# Patient Record
Sex: MALE | Race: WHITE | Employment: OTHER | URBAN - NONMETROPOLITAN AREA
[De-identification: names, ages, dates, MRNs, and addresses within clinical notes are randomized per-mention and may not be internally consistent; named-entity substitution may affect disease eponyms.]

---

## 2017-01-16 ENCOUNTER — OFFICE VISIT (OUTPATIENT)
Dept: FAMILY MEDICINE CLINIC | Age: 57
End: 2017-01-16

## 2017-01-16 VITALS
HEART RATE: 56 BPM | BODY MASS INDEX: 28.27 KG/M2 | SYSTOLIC BLOOD PRESSURE: 118 MMHG | OXYGEN SATURATION: 98 % | WEIGHT: 197 LBS | DIASTOLIC BLOOD PRESSURE: 60 MMHG

## 2017-01-16 DIAGNOSIS — Z23 NEEDS FLU SHOT: ICD-10-CM

## 2017-01-16 DIAGNOSIS — H61.20 IMPACTED CERUMEN, UNSPECIFIED LATERALITY: ICD-10-CM

## 2017-01-16 DIAGNOSIS — I10 ESSENTIAL HYPERTENSION: Primary | ICD-10-CM

## 2017-01-16 PROCEDURE — G8484 FLU IMMUNIZE NO ADMIN: HCPCS | Performed by: FAMILY MEDICINE

## 2017-01-16 PROCEDURE — 3017F COLORECTAL CA SCREEN DOC REV: CPT | Performed by: FAMILY MEDICINE

## 2017-01-16 PROCEDURE — 99213 OFFICE O/P EST LOW 20 MIN: CPT | Performed by: FAMILY MEDICINE

## 2017-01-16 PROCEDURE — 69210 REMOVE IMPACTED EAR WAX UNI: CPT | Performed by: FAMILY MEDICINE

## 2017-01-16 PROCEDURE — 90686 IIV4 VACC NO PRSV 0.5 ML IM: CPT | Performed by: FAMILY MEDICINE

## 2017-01-16 PROCEDURE — G8419 CALC BMI OUT NRM PARAM NOF/U: HCPCS | Performed by: FAMILY MEDICINE

## 2017-01-16 PROCEDURE — 1036F TOBACCO NON-USER: CPT | Performed by: FAMILY MEDICINE

## 2017-01-16 PROCEDURE — G8428 CUR MEDS NOT DOCUMENT: HCPCS | Performed by: FAMILY MEDICINE

## 2017-01-16 PROCEDURE — 90471 IMMUNIZATION ADMIN: CPT | Performed by: FAMILY MEDICINE

## 2017-01-16 RX ORDER — ACETAMINOPHEN 160 MG
TABLET,DISINTEGRATING ORAL
COMMUNITY
End: 2021-07-07

## 2017-01-16 RX ORDER — LISINOPRIL AND HYDROCHLOROTHIAZIDE 25; 20 MG/1; MG/1
1 TABLET ORAL DAILY
Qty: 90 TABLET | Refills: 3 | Status: SHIPPED | OUTPATIENT
Start: 2017-01-16 | End: 2018-02-05 | Stop reason: SDUPTHER

## 2017-01-16 RX ORDER — METOPROLOL SUCCINATE 25 MG/1
25 TABLET, EXTENDED RELEASE ORAL DAILY
Qty: 90 TABLET | Refills: 3 | Status: SHIPPED | OUTPATIENT
Start: 2017-01-16 | End: 2018-02-05 | Stop reason: ALTCHOICE

## 2017-01-16 ASSESSMENT — ENCOUNTER SYMPTOMS
GASTROINTESTINAL NEGATIVE: 1
RESPIRATORY NEGATIVE: 1

## 2017-01-31 ENCOUNTER — OFFICE VISIT (OUTPATIENT)
Dept: FAMILY MEDICINE CLINIC | Age: 57
End: 2017-01-31

## 2017-01-31 VITALS
OXYGEN SATURATION: 96 % | RESPIRATION RATE: 16 BRPM | WEIGHT: 198 LBS | BODY MASS INDEX: 29.33 KG/M2 | SYSTOLIC BLOOD PRESSURE: 116 MMHG | DIASTOLIC BLOOD PRESSURE: 70 MMHG | HEART RATE: 50 BPM | HEIGHT: 69 IN

## 2017-01-31 DIAGNOSIS — Z00.00 WELLNESS EXAMINATION: Primary | ICD-10-CM

## 2017-01-31 LAB
-: ABNORMAL
ABSOLUTE EOS #: 0.1 K/UL (ref 0–0.4)
ABSOLUTE LYMPH #: 1.4 K/UL (ref 1–4.8)
ABSOLUTE MONO #: 0.5 K/UL (ref 0.1–1.2)
ALBUMIN SERPL-MCNC: 4.3 G/DL (ref 3.5–5.2)
ALBUMIN/GLOBULIN RATIO: 1.8 (ref 1–2.5)
ALP BLD-CCNC: 51 U/L (ref 40–129)
ALT SERPL-CCNC: 10 U/L (ref 5–41)
AMORPHOUS: ABNORMAL
ANION GAP SERPL CALCULATED.3IONS-SCNC: 12 MMOL/L (ref 9–17)
AST SERPL-CCNC: 14 U/L
BACTERIA: ABNORMAL
BASOPHILS # BLD: 1 % (ref 0–2)
BASOPHILS ABSOLUTE: 0 K/UL (ref 0–0.2)
BILIRUB SERPL-MCNC: 0.53 MG/DL (ref 0.3–1.2)
BILIRUBIN URINE: NEGATIVE
BUN BLDV-MCNC: 17 MG/DL (ref 6–20)
BUN/CREAT BLD: 16 (ref 9–20)
CALCIUM SERPL-MCNC: 9.5 MG/DL (ref 8.6–10.4)
CASTS UA: ABNORMAL /LPF (ref 0–2)
CHLORIDE BLD-SCNC: 101 MMOL/L (ref 98–107)
CO2: 29 MMOL/L (ref 20–31)
COLOR: NORMAL
COMMENT UA: NORMAL
CREAT SERPL-MCNC: 1.05 MG/DL (ref 0.7–1.2)
CRYSTALS, UA: ABNORMAL /HPF
DIFFERENTIAL TYPE: ABNORMAL
EOSINOPHILS RELATIVE PERCENT: 2 % (ref 1–4)
EPITHELIAL CELLS UA: ABNORMAL /HPF (ref 0–5)
GFR AFRICAN AMERICAN: >60 ML/MIN
GFR NON-AFRICAN AMERICAN: >60 ML/MIN
GFR SERPL CREATININE-BSD FRML MDRD: ABNORMAL ML/MIN/{1.73_M2}
GFR SERPL CREATININE-BSD FRML MDRD: ABNORMAL ML/MIN/{1.73_M2}
GLUCOSE BLD-MCNC: 106 MG/DL (ref 70–99)
GLUCOSE URINE: NEGATIVE
HCT VFR BLD CALC: 42.5 % (ref 41–53)
HEMOGLOBIN: 14.3 G/DL (ref 13.5–17.5)
KETONES, URINE: NEGATIVE
LEUKOCYTE ESTERASE, URINE: NEGATIVE
LYMPHOCYTES # BLD: 28 % (ref 24–44)
MCH RBC QN AUTO: 29 PG (ref 26–34)
MCHC RBC AUTO-ENTMCNC: 33.6 G/DL (ref 31–37)
MCV RBC AUTO: 86.5 FL (ref 80–100)
MONOCYTES # BLD: 10 % (ref 1–7)
MUCUS: ABNORMAL
NITRITE, URINE: NEGATIVE
OTHER OBSERVATIONS UA: ABNORMAL
PDW BLD-RTO: 13.3 % (ref 11–14.5)
PH UA: 6 (ref 5–6)
PLATELET # BLD: 215 K/UL (ref 140–450)
PLATELET ESTIMATE: ABNORMAL
PMV BLD AUTO: 8.6 FL (ref 6–12)
POTASSIUM SERPL-SCNC: 4.8 MMOL/L (ref 3.7–5.3)
PROSTATE SPECIFIC ANTIGEN: 1.31 UG/L
PROTEIN UA: NEGATIVE
RBC # BLD: 4.91 M/UL (ref 4.5–5.9)
RBC # BLD: ABNORMAL 10*6/UL
RBC UA: ABNORMAL /HPF (ref 0–4)
RENAL EPITHELIAL, UA: ABNORMAL /HPF
SEG NEUTROPHILS: 59 % (ref 36–66)
SEGMENTED NEUTROPHILS ABSOLUTE COUNT: 3 K/UL (ref 1.8–7.7)
SODIUM BLD-SCNC: 142 MMOL/L (ref 135–144)
SPECIFIC GRAVITY UA: 1.02 (ref 1.01–1.02)
TOTAL PROTEIN: 6.7 G/DL (ref 6.4–8.3)
TRICHOMONAS: ABNORMAL
TURBIDITY: NORMAL
URINE HGB: NEGATIVE
UROBILINOGEN, URINE: NORMAL
WBC # BLD: 5.1 K/UL (ref 3.5–11)
WBC # BLD: ABNORMAL 10*3/UL
WBC UA: ABNORMAL /HPF (ref 0–4)
YEAST: ABNORMAL

## 2017-01-31 PROCEDURE — 85025 COMPLETE CBC W/AUTO DIFF WBC: CPT | Performed by: FAMILY MEDICINE

## 2017-01-31 PROCEDURE — 99396 PREV VISIT EST AGE 40-64: CPT | Performed by: FAMILY MEDICINE

## 2017-01-31 PROCEDURE — G0103 PSA SCREENING: HCPCS | Performed by: FAMILY MEDICINE

## 2017-01-31 PROCEDURE — 81001 URINALYSIS AUTO W/SCOPE: CPT | Performed by: FAMILY MEDICINE

## 2017-01-31 PROCEDURE — 36415 COLL VENOUS BLD VENIPUNCTURE: CPT | Performed by: FAMILY MEDICINE

## 2017-01-31 PROCEDURE — 80053 COMPREHEN METABOLIC PANEL: CPT | Performed by: FAMILY MEDICINE

## 2017-12-19 ENCOUNTER — OFFICE VISIT (OUTPATIENT)
Dept: FAMILY MEDICINE CLINIC | Age: 57
End: 2017-12-19
Payer: COMMERCIAL

## 2017-12-19 VITALS
WEIGHT: 200.2 LBS | HEIGHT: 69 IN | SYSTOLIC BLOOD PRESSURE: 126 MMHG | OXYGEN SATURATION: 98 % | HEART RATE: 62 BPM | BODY MASS INDEX: 29.65 KG/M2 | DIASTOLIC BLOOD PRESSURE: 72 MMHG

## 2017-12-19 DIAGNOSIS — Z23 NEED FOR IMMUNIZATION AGAINST INFLUENZA: ICD-10-CM

## 2017-12-19 DIAGNOSIS — M25.532 LEFT WRIST PAIN: Primary | ICD-10-CM

## 2017-12-19 PROCEDURE — 3017F COLORECTAL CA SCREEN DOC REV: CPT | Performed by: NURSE PRACTITIONER

## 2017-12-19 PROCEDURE — G8484 FLU IMMUNIZE NO ADMIN: HCPCS | Performed by: NURSE PRACTITIONER

## 2017-12-19 PROCEDURE — 99213 OFFICE O/P EST LOW 20 MIN: CPT | Performed by: NURSE PRACTITIONER

## 2017-12-19 PROCEDURE — G8427 DOCREV CUR MEDS BY ELIG CLIN: HCPCS | Performed by: NURSE PRACTITIONER

## 2017-12-19 PROCEDURE — 1036F TOBACCO NON-USER: CPT | Performed by: NURSE PRACTITIONER

## 2017-12-19 PROCEDURE — G8419 CALC BMI OUT NRM PARAM NOF/U: HCPCS | Performed by: NURSE PRACTITIONER

## 2017-12-19 PROCEDURE — 90686 IIV4 VACC NO PRSV 0.5 ML IM: CPT | Performed by: NURSE PRACTITIONER

## 2017-12-19 PROCEDURE — 90471 IMMUNIZATION ADMIN: CPT | Performed by: NURSE PRACTITIONER

## 2017-12-19 ASSESSMENT — ENCOUNTER SYMPTOMS
DIARRHEA: 0
VOMITING: 0
COUGH: 0
SHORTNESS OF BREATH: 0

## 2017-12-19 NOTE — PROGRESS NOTES
[address]  [phone]      Adair Trinh is a 61 y.o. male who presents today for his medical conditions/complaints as noted below. Adair Trinh is c/o of Wrist Pain (fell a month and a half ago- left sided) and Flu Vaccine (would like today)      HPI:     Wrist Injury    The incident occurred more than 1 week ago. Injury mechanism: fall on outstretched hand. The pain is present in the left wrist. The pain does not radiate. Pertinent negatives include no chest pain, muscle weakness, numbness or tingling. He has tried NSAIDs and immobilization for the symptoms. The treatment provided moderate relief.        Past Medical History:   Diagnosis Date    Back pain     history of    Basal cell cancer 2008    history of, right scapula, right eyelid, mid back,    Benign prostatic hypertrophy     history of    Cancer of lower lip     pre cancer per dermatologist    Carotid bruit     history of    Chondromalacia     External hemorrhoid     history of    Fibrous histiocytoma     history of, left ankle    Hearing deficit     Hypertension     Left hip pain     with arthritis    Numbness and tingling     of arms    Rectal bleeding     history of    Right knee pain     history of    Right shoulder pain     history of    Ulnar neuropathy     history of      Past Surgical History:   Procedure Laterality Date    COLONOSCOPY  2005    normal    COLONOSCOPY  3/2015    hemorrhoids    KNEE SURGERY Right 1981    MOHS SURGERY Right 09/18/2008    wide exc ba cell ca with z plasty right scapula    MOHS SURGERY Right 12/13/2011    right eyelid basal cell cancer    MOHS SURGERY Left 1/28/15    left upper nose basal cell carcinoman Dr Usha Fontenot Bilateral 1997    TOTAL KNEE ARTHROPLASTY Right 2009    VASECTOMY       Family History   Problem Relation Age of Onset    Stroke Father     Cancer Father      Stomach    Other Other      carotid artery but does note some tingling in digits 2, 3, 4 upon this motion   Lymphadenopathy:     He has no cervical adenopathy. Psychiatric: He has a normal mood and affect. His behavior is normal.   Nursing note and vitals reviewed. Assessment:      1. Left wrist pain  XR WRIST LEFT (MIN 3 VIEWS)   2. Need for immunization against influenza  INFLUENZA, QUADV, 3 YRS AND OLDER, IM, PF, PREFILL SYR OR SDV, 0.5ML (FLUZONE QUADV, PF)       Plan:      Return if symptoms worsen or fail to improve. Orders Placed This Encounter   Procedures    XR WRIST LEFT (MIN 3 VIEWS)     Standing Status:   Future     Standing Expiration Date:   12/19/2018     Order Specific Question:   Reason for exam:     Answer:   H/O fall on outstretched hand 6 weeks ago    INFLUENZA, QUADV, 3 YRS AND OLDER, IM, PF, PREFILL SYR OR SDV, 0.5ML (FLUZONE QUADV, PF)     No orders of the defined types were placed in this encounter. Wrist pain - will obtain xray to r/o fracture, consider CTS if xray normal given clinical symptoms. Discussed immobilization brace and exercise for CTS if xray WNL, patient is agreeable. Patient given educational materials - see patient instructions. All patient questions answered. Pt voiced understanding.          Electronically signed by CURTIS Cox on 12/19/2017 at 10:22 AM

## 2017-12-19 NOTE — PROGRESS NOTES
Have you had an allergic reaction to the flu (influenza) shot? no  Are you allergic to eggs or any component of the flu vaccine? no  Do you have a history of Guillain-[place] Syndrome (GBS), which is paralysis after receiving the flu vaccine? no  Are you feeling well today? yes  Flu vaccine given as ordered. Patient tolerated it well. No questions re: VIS information.

## 2017-12-19 NOTE — PATIENT INSTRUCTIONS
If xray is normal and no fracture, can consider some of the exercises below for possible carpal tunnel, as well as immobilizing braces as discussed. Patient Education        Carpal Tunnel Syndrome: Exercises  Your Care Instructions  Here are some examples of typical rehabilitation exercises for your condition. Start each exercise slowly. Ease off the exercise if you start to have pain. Your doctor or your physical or occupational therapist will tell you when you can start these exercises and which ones will work best for you. Warm-up stretches  When you no longer have pain or numbness, you can do exercises to help prevent carpal tunnel syndrome from coming back. Do not do any stretch or movement that is uncomfortable or painful. 1. Rotate your wrist up, down, and from side to side. Repeat 4 times. 2. Stretch your fingers far apart. Relax them, and then stretch them again. Repeat 4 times. 3. Stretch your thumb by pulling it back gently, holding it, and then releasing it. Repeat 4 times. How to do the exercises  Prayer stretch    1. Start with your palms together in front of your chest just below your chin. 2. Slowly lower your hands toward your waistline, keeping your hands close to your stomach and your palms together until you feel a mild to moderate stretch under your forearms. 3. Hold for at least 15 to 30 seconds. Repeat 2 to 4 times. Wrist flexor stretch    1. Extend your arm in front of you with your palm up. 2. Bend your wrist, pointing your hand toward the floor. 3. With your other hand, gently bend your wrist farther until you feel a mild to moderate stretch in your forearm. 4. Hold for at least 15 to 30 seconds. Repeat 2 to 4 times. Wrist extensor stretch    1. Repeat steps 1 through 4 of the stretch above, but begin with your extended hand palm down. Follow-up care is a key part of your treatment and safety.  Be sure to make and go to all appointments, and call your doctor if you are having problems. It's also a good idea to know your test results and keep a list of the medicines you take. Where can you learn more? Go to https://chpepiceweb.[email or web address] [email or web address]. org and sign in to your Ivalua account. Enter O610 in the Elastera box to learn more about \"Carpal Tunnel Syndrome: Exercises. \"     If you do not have an account, please click on the \"Sign Up Now\" link. Current as of: March 21, 2017  Content Version: 11.4  © 7381-2299 Healthwise, Incorporated. Care instructions adapted under license by [facility] ([facility]). If you have questions about a medical condition or this instruction, always ask your healthcare professional. [address] any warranty or liability for your use of this information.

## 2017-12-21 ENCOUNTER — TELEPHONE (OUTPATIENT)
Dept: FAMILY MEDICINE CLINIC | Age: 57
End: 2017-12-21

## 2017-12-22 DIAGNOSIS — M25.532 LEFT WRIST PAIN: ICD-10-CM

## 2018-02-05 ENCOUNTER — OFFICE VISIT (OUTPATIENT)
Dept: FAMILY MEDICINE CLINIC | Age: 58
End: 2018-02-05
Payer: COMMERCIAL

## 2018-02-05 VITALS
HEART RATE: 50 BPM | WEIGHT: 190 LBS | DIASTOLIC BLOOD PRESSURE: 80 MMHG | HEIGHT: 69 IN | SYSTOLIC BLOOD PRESSURE: 128 MMHG | BODY MASS INDEX: 28.14 KG/M2 | OXYGEN SATURATION: 98 %

## 2018-02-05 DIAGNOSIS — Z00.00 WELL ADULT EXAM: Primary | ICD-10-CM

## 2018-02-05 PROCEDURE — 99396 PREV VISIT EST AGE 40-64: CPT | Performed by: FAMILY MEDICINE

## 2018-02-05 RX ORDER — TAMSULOSIN HYDROCHLORIDE 0.4 MG/1
0.4 CAPSULE ORAL DAILY
Qty: 30 CAPSULE | Refills: 3 | Status: SHIPPED | OUTPATIENT
Start: 2018-02-05 | End: 2019-01-28

## 2018-02-05 RX ORDER — LISINOPRIL AND HYDROCHLOROTHIAZIDE 25; 20 MG/1; MG/1
1 TABLET ORAL DAILY
Qty: 90 TABLET | Refills: 3 | Status: SHIPPED | OUTPATIENT
Start: 2018-02-05 | End: 2019-01-28 | Stop reason: SDUPTHER

## 2018-02-05 NOTE — PROGRESS NOTES
Bilateral earwash done with good results.
per tablet Take 1 tablet by mouth daily 90 tablet 3    Naproxen Sodium (ALEVE PO) Take by mouth daily          Past Medical History:   Diagnosis Date    Back pain     history of    Basal cell cancer 2008    history of, right scapula, right eyelid, mid back,    Benign prostatic hypertrophy     history of    Cancer of lower lip     pre cancer per dermatologist    Carotid bruit     history of    Chondromalacia     External hemorrhoid     history of    Fibrous histiocytoma     history of, left ankle    Hearing deficit     Hypertension     Left hip pain     with arthritis    Numbness and tingling     of arms    Rectal bleeding     history of    Right knee pain     history of    Right shoulder pain     history of    Ulnar neuropathy     history of     Past Surgical History:   Procedure Laterality Date    COLONOSCOPY  2005    normal    COLONOSCOPY  3/2015    hemorrhoids    KNEE SURGERY Right 1981    MOHS SURGERY Right 09/18/2008    wide exc ba cell ca with z plasty right scapula    MOHS SURGERY Right 12/13/2011    right eyelid basal cell cancer    MOHS SURGERY Left 1/28/15    left upper nose basal cell carcinoman Dr Taylor Rodrigues Bilateral 1997    TOTAL KNEE ARTHROPLASTY Right 2009    VASECTOMY       Family History   Problem Relation Age of Onset    Stroke Father     Cancer Father      Stomach    Other Other      carotid artery disease     Social History     Social History    Marital status:      Spouse name: N/A    Number of children: N/A    Years of education: N/A     Occupational History    Not on file.      Social History Main Topics    Smoking status: Former Smoker     Packs/day: 0.50     Years: 10.00     Types: Cigarettes    Smokeless tobacco: Never Used    Alcohol use 0.0 oz/week      Comment: 2 per week    Drug use: No    Sexual activity: Not on file     Other Topics Concern    Not on file     Social History Narrative    No narrative on file       Review

## 2018-02-07 ENCOUNTER — HOSPITAL ENCOUNTER (OUTPATIENT)
Dept: LAB | Age: 58
Setting detail: SPECIMEN
Discharge: HOME | End: 2018-02-07
Payer: COMMERCIAL

## 2018-02-07 DIAGNOSIS — Z00.00 WELL ADULT EXAM: ICD-10-CM

## 2018-02-07 LAB
-: ABNORMAL
ABSOLUTE EOS #: 0.2 K/UL (ref 0–0.4)
ABSOLUTE IMMATURE GRANULOCYTE: NORMAL K/UL (ref 0–0.3)
ABSOLUTE LYMPH #: 1.4 K/UL (ref 1–4.8)
ABSOLUTE MONO #: 0.4 K/UL (ref 0.1–1.2)
ALBUMIN SERPL-MCNC: 4.2 G/DL (ref 3.5–5.2)
ALBUMIN/GLOBULIN RATIO: 1.7 (ref 1–2.5)
ALP BLD-CCNC: 45 U/L (ref 40–129)
ALT SERPL-CCNC: 10 U/L (ref 5–41)
AMORPHOUS: ABNORMAL
ANION GAP SERPL CALCULATED.3IONS-SCNC: 13 MMOL/L (ref 9–17)
AST SERPL-CCNC: 12 U/L
BACTERIA: ABNORMAL
BASOPHILS # BLD: 1 % (ref 0–2)
BASOPHILS ABSOLUTE: 0 K/UL (ref 0–0.2)
BILIRUB SERPL-MCNC: 0.72 MG/DL (ref 0.3–1.2)
BILIRUBIN URINE: NEGATIVE
BUN BLDV-MCNC: 16 MG/DL (ref 6–20)
BUN/CREAT BLD: 18 (ref 9–20)
CALCIUM SERPL-MCNC: 9.7 MG/DL (ref 8.6–10.4)
CASTS UA: ABNORMAL /LPF (ref 0–2)
CHLORIDE BLD-SCNC: 101 MMOL/L (ref 98–107)
CO2: 28 MMOL/L (ref 20–31)
COLOR: NORMAL
COMMENT UA: NORMAL
CREAT SERPL-MCNC: 0.89 MG/DL (ref 0.7–1.2)
CRYSTALS, UA: ABNORMAL /HPF
DIFFERENTIAL TYPE: NORMAL
EOSINOPHILS RELATIVE PERCENT: 3 % (ref 1–8)
EPITHELIAL CELLS UA: ABNORMAL /HPF (ref 0–5)
GFR AFRICAN AMERICAN: >60 ML/MIN
GFR NON-AFRICAN AMERICAN: >60 ML/MIN
GFR SERPL CREATININE-BSD FRML MDRD: ABNORMAL ML/MIN/{1.73_M2}
GFR SERPL CREATININE-BSD FRML MDRD: ABNORMAL ML/MIN/{1.73_M2}
GLUCOSE BLD-MCNC: 100 MG/DL (ref 70–99)
GLUCOSE URINE: NEGATIVE
HCT VFR BLD CALC: 42.5 % (ref 41–53)
HEMOGLOBIN: 14.2 G/DL (ref 13.5–17.5)
HEPATITIS C ANTIBODY: NONREACTIVE
HIV AG/AB: NONREACTIVE
IMMATURE GRANULOCYTES: NORMAL %
KETONES, URINE: NEGATIVE
LEUKOCYTE ESTERASE, URINE: NEGATIVE
LYMPHOCYTES # BLD: 29 % (ref 15–43)
MCH RBC QN AUTO: 29.2 PG (ref 26–34)
MCHC RBC AUTO-ENTMCNC: 33.5 G/DL (ref 31–37)
MCV RBC AUTO: 87.1 FL (ref 80–100)
MONOCYTES # BLD: 9 % (ref 6–14)
MUCUS: ABNORMAL
NITRITE, URINE: NEGATIVE
NRBC AUTOMATED: NORMAL PER 100 WBC
OTHER OBSERVATIONS UA: ABNORMAL
PDW BLD-RTO: 13.7 % (ref 11–14.5)
PH UA: 6 (ref 5–6)
PLATELET # BLD: 195 K/UL (ref 140–450)
PLATELET ESTIMATE: NORMAL
PMV BLD AUTO: 8.8 FL (ref 6–12)
POTASSIUM SERPL-SCNC: 4.1 MMOL/L (ref 3.7–5.3)
PROSTATE SPECIFIC ANTIGEN: 2.66 UG/L
PROTEIN UA: NEGATIVE
RBC # BLD: 4.87 M/UL (ref 4.5–5.9)
RBC # BLD: NORMAL 10*6/UL
RBC UA: ABNORMAL /HPF (ref 0–4)
RENAL EPITHELIAL, UA: ABNORMAL /HPF
SEG NEUTROPHILS: 58 % (ref 44–74)
SEGMENTED NEUTROPHILS ABSOLUTE COUNT: 2.9 K/UL (ref 1.8–7.7)
SODIUM BLD-SCNC: 142 MMOL/L (ref 135–144)
SPECIFIC GRAVITY UA: 1.02 (ref 1.01–1.02)
TOTAL PROTEIN: 6.7 G/DL (ref 6.4–8.3)
TRICHOMONAS: ABNORMAL
TURBIDITY: NORMAL
URINE HGB: NEGATIVE
UROBILINOGEN, URINE: NORMAL
WBC # BLD: 5 K/UL (ref 3.5–11)
WBC # BLD: NORMAL 10*3/UL
WBC UA: ABNORMAL /HPF (ref 0–4)
YEAST: ABNORMAL

## 2018-02-07 PROCEDURE — 85025 COMPLETE CBC W/AUTO DIFF WBC: CPT

## 2018-02-07 PROCEDURE — 81001 URINALYSIS AUTO W/SCOPE: CPT

## 2018-02-07 PROCEDURE — 86803 HEPATITIS C AB TEST: CPT

## 2018-02-07 PROCEDURE — 80053 COMPREHEN METABOLIC PANEL: CPT

## 2018-02-07 PROCEDURE — 87389 HIV-1 AG W/HIV-1&-2 AB AG IA: CPT

## 2018-02-07 PROCEDURE — G0103 PSA SCREENING: HCPCS

## 2018-02-07 PROCEDURE — 36415 COLL VENOUS BLD VENIPUNCTURE: CPT

## 2018-02-08 DIAGNOSIS — R97.20 INCREASED PROSTATE SPECIFIC ANTIGEN (PSA) VELOCITY: Primary | ICD-10-CM

## 2018-05-14 ENCOUNTER — HOSPITAL ENCOUNTER (OUTPATIENT)
Dept: LAB | Age: 58
Setting detail: SPECIMEN
Discharge: HOME | End: 2018-05-14
Payer: COMMERCIAL

## 2018-05-14 DIAGNOSIS — R97.20 INCREASED PROSTATE SPECIFIC ANTIGEN (PSA) VELOCITY: ICD-10-CM

## 2018-05-14 LAB — PROSTATE SPECIFIC ANTIGEN: 1.56 UG/L

## 2018-05-14 PROCEDURE — 36415 COLL VENOUS BLD VENIPUNCTURE: CPT

## 2018-05-14 PROCEDURE — 84153 ASSAY OF PSA TOTAL: CPT

## 2019-01-28 ENCOUNTER — OFFICE VISIT (OUTPATIENT)
Dept: FAMILY MEDICINE CLINIC | Age: 59
End: 2019-01-28
Payer: COMMERCIAL

## 2019-01-28 VITALS
DIASTOLIC BLOOD PRESSURE: 82 MMHG | WEIGHT: 196 LBS | BODY MASS INDEX: 29.16 KG/M2 | SYSTOLIC BLOOD PRESSURE: 120 MMHG | HEART RATE: 69 BPM | OXYGEN SATURATION: 99 %

## 2019-01-28 DIAGNOSIS — G89.29 CHRONIC LEFT SHOULDER PAIN: Primary | ICD-10-CM

## 2019-01-28 DIAGNOSIS — M25.512 CHRONIC LEFT SHOULDER PAIN: Primary | ICD-10-CM

## 2019-01-28 PROCEDURE — 99214 OFFICE O/P EST MOD 30 MIN: CPT | Performed by: FAMILY MEDICINE

## 2019-01-28 PROCEDURE — G8419 CALC BMI OUT NRM PARAM NOF/U: HCPCS | Performed by: FAMILY MEDICINE

## 2019-01-28 PROCEDURE — 3017F COLORECTAL CA SCREEN DOC REV: CPT | Performed by: FAMILY MEDICINE

## 2019-01-28 PROCEDURE — 1036F TOBACCO NON-USER: CPT | Performed by: FAMILY MEDICINE

## 2019-01-28 PROCEDURE — G8484 FLU IMMUNIZE NO ADMIN: HCPCS | Performed by: FAMILY MEDICINE

## 2019-01-28 PROCEDURE — G8427 DOCREV CUR MEDS BY ELIG CLIN: HCPCS | Performed by: FAMILY MEDICINE

## 2019-01-28 RX ORDER — LISINOPRIL AND HYDROCHLOROTHIAZIDE 25; 20 MG/1; MG/1
1 TABLET ORAL DAILY
Qty: 90 TABLET | Refills: 3 | Status: SHIPPED | OUTPATIENT
Start: 2019-01-28 | End: 2019-01-28 | Stop reason: SDUPTHER

## 2019-01-28 RX ORDER — LISINOPRIL AND HYDROCHLOROTHIAZIDE 25; 20 MG/1; MG/1
1 TABLET ORAL DAILY
Qty: 90 TABLET | Refills: 3 | Status: SHIPPED | OUTPATIENT
Start: 2019-01-28 | End: 2020-02-13 | Stop reason: SDUPTHER

## 2019-01-31 ENCOUNTER — TELEPHONE (OUTPATIENT)
Dept: FAMILY MEDICINE CLINIC | Age: 59
End: 2019-01-31

## 2019-03-18 ENCOUNTER — OFFICE VISIT (OUTPATIENT)
Dept: FAMILY MEDICINE CLINIC | Age: 59
End: 2019-03-18
Payer: COMMERCIAL

## 2019-03-18 VITALS
HEART RATE: 69 BPM | DIASTOLIC BLOOD PRESSURE: 88 MMHG | WEIGHT: 190 LBS | SYSTOLIC BLOOD PRESSURE: 138 MMHG | OXYGEN SATURATION: 98 % | BODY MASS INDEX: 28.26 KG/M2

## 2019-03-18 DIAGNOSIS — H61.21 IMPACTED CERUMEN OF RIGHT EAR: Primary | ICD-10-CM

## 2019-03-18 PROCEDURE — 99213 OFFICE O/P EST LOW 20 MIN: CPT | Performed by: FAMILY MEDICINE

## 2019-03-18 PROCEDURE — G8419 CALC BMI OUT NRM PARAM NOF/U: HCPCS | Performed by: FAMILY MEDICINE

## 2019-03-18 PROCEDURE — G8427 DOCREV CUR MEDS BY ELIG CLIN: HCPCS | Performed by: FAMILY MEDICINE

## 2019-03-18 PROCEDURE — 1036F TOBACCO NON-USER: CPT | Performed by: FAMILY MEDICINE

## 2019-03-18 PROCEDURE — G8484 FLU IMMUNIZE NO ADMIN: HCPCS | Performed by: FAMILY MEDICINE

## 2019-03-18 PROCEDURE — 3017F COLORECTAL CA SCREEN DOC REV: CPT | Performed by: FAMILY MEDICINE

## 2019-03-18 RX ORDER — IBUPROFEN 200 MG
400 TABLET ORAL EVERY 6 HOURS PRN
COMMUNITY

## 2019-03-18 ASSESSMENT — PATIENT HEALTH QUESTIONNAIRE - PHQ9
SUM OF ALL RESPONSES TO PHQ QUESTIONS 1-9: 0
SUM OF ALL RESPONSES TO PHQ9 QUESTIONS 1 & 2: 0
1. LITTLE INTEREST OR PLEASURE IN DOING THINGS: 0
SUM OF ALL RESPONSES TO PHQ QUESTIONS 1-9: 0
2. FEELING DOWN, DEPRESSED OR HOPELESS: 0

## 2019-03-18 ASSESSMENT — ENCOUNTER SYMPTOMS
RESPIRATORY NEGATIVE: 1
GASTROINTESTINAL NEGATIVE: 1

## 2019-11-06 LAB
ANION GAP SERPL CALCULATED.3IONS-SCNC: 8 MEQ/L (ref 5–13)
CHLORIDE BLD-SCNC: 106 MMOL/L (ref 98–107)
CO2: 29 MMOL/L (ref 23–31)
HCT VFR BLD CALC: 41.5 % (ref 39.8–49.4)
HEMOGLOBIN: 14.7 G/DL (ref 13.5–16.8)
MCH RBC QN AUTO: 30.4 PG (ref 27.9–33.7)
MCHC RBC AUTO-ENTMCNC: 35.4 G/DL (ref 33–35.2)
MCV RBC AUTO: 85.9 FL (ref 83.9–97)
PDW BLD-RTO: 13.3 % (ref 11.7–15.5)
PLATELET # BLD: 218 10'3/UL (ref 144–327)
PMV BLD AUTO: 7.8 FL (ref 7.2–10.4)
POTASSIUM SERPL-SCNC: 4.2 MMOL/L (ref 3.5–4.5)
RBC # BLD: 4.83 10'6/UL (ref 4.24–5.64)
SODIUM BLD-SCNC: 143 MMOL/L (ref 136–145)
WBC: 4.5 10'3/UL (ref 4.1–10.2)

## 2019-11-12 ENCOUNTER — OFFICE VISIT (OUTPATIENT)
Dept: FAMILY MEDICINE CLINIC | Age: 59
End: 2019-11-12
Payer: COMMERCIAL

## 2019-11-12 VITALS
HEIGHT: 69 IN | SYSTOLIC BLOOD PRESSURE: 114 MMHG | WEIGHT: 199 LBS | BODY MASS INDEX: 29.47 KG/M2 | OXYGEN SATURATION: 99 % | DIASTOLIC BLOOD PRESSURE: 64 MMHG | HEART RATE: 56 BPM

## 2019-11-12 DIAGNOSIS — M25.511 CHRONIC RIGHT SHOULDER PAIN: Primary | ICD-10-CM

## 2019-11-12 DIAGNOSIS — G89.29 CHRONIC RIGHT SHOULDER PAIN: Primary | ICD-10-CM

## 2019-11-12 PROCEDURE — 1036F TOBACCO NON-USER: CPT | Performed by: FAMILY MEDICINE

## 2019-11-12 PROCEDURE — 99214 OFFICE O/P EST MOD 30 MIN: CPT | Performed by: FAMILY MEDICINE

## 2019-11-12 PROCEDURE — G8419 CALC BMI OUT NRM PARAM NOF/U: HCPCS | Performed by: FAMILY MEDICINE

## 2019-11-12 PROCEDURE — G8427 DOCREV CUR MEDS BY ELIG CLIN: HCPCS | Performed by: FAMILY MEDICINE

## 2019-11-12 PROCEDURE — 3017F COLORECTAL CA SCREEN DOC REV: CPT | Performed by: FAMILY MEDICINE

## 2019-11-12 PROCEDURE — G8484 FLU IMMUNIZE NO ADMIN: HCPCS | Performed by: FAMILY MEDICINE

## 2019-11-12 RX ORDER — VIT C/B6/B5/MAGNESIUM/HERB 173 50-5-6-5MG
CAPSULE ORAL
COMMUNITY
End: 2022-03-01

## 2020-02-13 ENCOUNTER — HOSPITAL ENCOUNTER (OUTPATIENT)
Age: 60
Setting detail: SPECIMEN
Discharge: HOME | End: 2020-02-13
Payer: COMMERCIAL

## 2020-02-13 ENCOUNTER — OFFICE VISIT (OUTPATIENT)
Dept: FAMILY MEDICINE CLINIC | Age: 60
End: 2020-02-13
Payer: COMMERCIAL

## 2020-02-13 VITALS
BODY MASS INDEX: 28.42 KG/M2 | DIASTOLIC BLOOD PRESSURE: 70 MMHG | WEIGHT: 191 LBS | HEART RATE: 58 BPM | RESPIRATION RATE: 16 BRPM | OXYGEN SATURATION: 98 % | SYSTOLIC BLOOD PRESSURE: 126 MMHG

## 2020-02-13 LAB
ABSOLUTE EOS #: 0.11 K/UL (ref 0–0.44)
ABSOLUTE IMMATURE GRANULOCYTE: <0.03 K/UL (ref 0–0.3)
ABSOLUTE LYMPH #: 1.34 K/UL (ref 1.1–3.7)
ABSOLUTE MONO #: 0.42 K/UL (ref 0.1–1.2)
ALBUMIN SERPL-MCNC: 4.4 G/DL (ref 3.5–5.2)
ALBUMIN/GLOBULIN RATIO: 1.5 (ref 1–2.5)
ALP BLD-CCNC: 51 U/L (ref 40–129)
ALT SERPL-CCNC: 9 U/L (ref 5–41)
ANION GAP SERPL CALCULATED.3IONS-SCNC: 11 MMOL/L (ref 9–17)
AST SERPL-CCNC: 12 U/L
BASOPHILS # BLD: 1 % (ref 0–2)
BASOPHILS ABSOLUTE: 0.04 K/UL (ref 0–0.2)
BILIRUB SERPL-MCNC: 0.5 MG/DL (ref 0.3–1.2)
BUN BLDV-MCNC: 16 MG/DL (ref 8–23)
BUN/CREAT BLD: 17 (ref 9–20)
CALCIUM SERPL-MCNC: 10 MG/DL (ref 8.6–10.4)
CHLORIDE BLD-SCNC: 101 MMOL/L (ref 98–107)
CHOLESTEROL/HDL RATIO: 3.6
CHOLESTEROL: 206 MG/DL
CO2: 29 MMOL/L (ref 20–31)
CREAT SERPL-MCNC: 0.94 MG/DL (ref 0.7–1.2)
DIFFERENTIAL TYPE: NORMAL
EOSINOPHILS RELATIVE PERCENT: 2 % (ref 1–4)
GFR AFRICAN AMERICAN: >60 ML/MIN
GFR NON-AFRICAN AMERICAN: >60 ML/MIN
GFR SERPL CREATININE-BSD FRML MDRD: ABNORMAL ML/MIN/{1.73_M2}
GFR SERPL CREATININE-BSD FRML MDRD: ABNORMAL ML/MIN/{1.73_M2}
GLUCOSE BLD-MCNC: 106 MG/DL (ref 70–99)
HCT VFR BLD CALC: 44.2 % (ref 40.7–50.3)
HDLC SERPL-MCNC: 58 MG/DL
HEMOGLOBIN: 14.7 G/DL (ref 13–17)
IMMATURE GRANULOCYTES: 0 %
LDL CHOLESTEROL: 128 MG/DL (ref 0–130)
LYMPHOCYTES # BLD: 29 % (ref 24–43)
MCH RBC QN AUTO: 28.9 PG (ref 25.2–33.5)
MCHC RBC AUTO-ENTMCNC: 33.3 G/DL (ref 25.2–33.5)
MCV RBC AUTO: 86.8 FL (ref 82.6–102.9)
MONOCYTES # BLD: 9 % (ref 3–12)
NRBC AUTOMATED: 0 PER 100 WBC
PDW BLD-RTO: 13 % (ref 11.8–14.4)
PLATELET # BLD: 252 K/UL (ref 138–453)
PLATELET ESTIMATE: NORMAL
PMV BLD AUTO: 10.3 FL (ref 8.1–13.5)
POTASSIUM SERPL-SCNC: 4.2 MMOL/L (ref 3.7–5.3)
PROSTATE SPECIFIC ANTIGEN: 2.6 UG/L
RBC # BLD: 5.09 M/UL (ref 4.21–5.77)
RBC # BLD: NORMAL 10*6/UL
SEG NEUTROPHILS: 58 % (ref 36–65)
SEGMENTED NEUTROPHILS ABSOLUTE COUNT: 2.68 K/UL (ref 1.5–8.1)
SODIUM BLD-SCNC: 141 MMOL/L (ref 135–144)
TOTAL PROTEIN: 7.3 G/DL (ref 6.4–8.3)
TRIGL SERPL-MCNC: 99 MG/DL
VLDLC SERPL CALC-MCNC: ABNORMAL MG/DL (ref 1–30)
WBC # BLD: 4.6 K/UL (ref 3.5–11.3)
WBC # BLD: NORMAL 10*3/UL

## 2020-02-13 PROCEDURE — G8482 FLU IMMUNIZE ORDER/ADMIN: HCPCS | Performed by: FAMILY MEDICINE

## 2020-02-13 PROCEDURE — 80061 LIPID PANEL: CPT

## 2020-02-13 PROCEDURE — 36415 COLL VENOUS BLD VENIPUNCTURE: CPT

## 2020-02-13 PROCEDURE — 90471 IMMUNIZATION ADMIN: CPT | Performed by: FAMILY MEDICINE

## 2020-02-13 PROCEDURE — G0103 PSA SCREENING: HCPCS

## 2020-02-13 PROCEDURE — 85025 COMPLETE CBC W/AUTO DIFF WBC: CPT

## 2020-02-13 PROCEDURE — 99396 PREV VISIT EST AGE 40-64: CPT | Performed by: FAMILY MEDICINE

## 2020-02-13 PROCEDURE — 69210 REMOVE IMPACTED EAR WAX UNI: CPT | Performed by: FAMILY MEDICINE

## 2020-02-13 PROCEDURE — 90686 IIV4 VACC NO PRSV 0.5 ML IM: CPT | Performed by: FAMILY MEDICINE

## 2020-02-13 PROCEDURE — 80053 COMPREHEN METABOLIC PANEL: CPT

## 2020-02-13 RX ORDER — LISINOPRIL AND HYDROCHLOROTHIAZIDE 25; 20 MG/1; MG/1
1 TABLET ORAL DAILY
Qty: 90 TABLET | Refills: 3 | Status: SHIPPED | OUTPATIENT
Start: 2020-02-13 | End: 2020-02-13 | Stop reason: SDUPTHER

## 2020-02-13 RX ORDER — LISINOPRIL AND HYDROCHLOROTHIAZIDE 25; 20 MG/1; MG/1
1 TABLET ORAL DAILY
Qty: 90 TABLET | Refills: 3 | Status: SHIPPED | OUTPATIENT
Start: 2020-02-13 | End: 2021-02-18 | Stop reason: SDUPTHER

## 2020-02-13 ASSESSMENT — PATIENT HEALTH QUESTIONNAIRE - PHQ9
2. FEELING DOWN, DEPRESSED OR HOPELESS: 0
SUM OF ALL RESPONSES TO PHQ QUESTIONS 1-9: 0
SUM OF ALL RESPONSES TO PHQ9 QUESTIONS 1 & 2: 0
1. LITTLE INTEREST OR PLEASURE IN DOING THINGS: 0
SUM OF ALL RESPONSES TO PHQ QUESTIONS 1-9: 0

## 2020-02-13 NOTE — PROGRESS NOTES
History and Physical      Evaristo Barton  YOB: 1960    Date of Service:  2/13/2020    Chief Complaint:   Evaristo Barton is a 60 y.o. male who presents for complete physical examination.     HPI: progressing activity with shoulders      Wt Readings from Last 3 Encounters:   02/13/20 191 lb (86.6 kg)   11/12/19 199 lb (90.3 kg)   03/18/19 190 lb (86.2 kg)     BP Readings from Last 3 Encounters:   02/13/20 126/70   11/12/19 114/64   03/18/19 138/88       Patient Active Problem List   Diagnosis    Hemorrhoid    Essential hypertension    Malignant basal cell neoplasm of skin    Arthritis    Benign non-nodular prostatic hyperplasia with lower urinary tract symptoms       Preventive Care:  Health Maintenance   Topic Date Due    Shingles Vaccine (1 of 2) 06/14/2009    Diabetes screen  01/19/2019    Flu vaccine (1) 09/01/2019    Potassium monitoring  11/06/2020    Creatinine monitoring  11/06/2020    Lipid screen  01/19/2021    DTaP/Tdap/Td vaccine (2 - Td) 06/19/2023    Colon cancer screen colonoscopy  03/25/2025    Hepatitis C screen  Completed    HIV screen  Completed    Hepatitis A vaccine  Aged Out    Hepatitis B vaccine  Aged Out    Hib vaccine  Aged Out    Meningococcal (ACWY) vaccine  Aged Out    Pneumococcal 0-64 years Vaccine  Aged Out      Self-testicular exams: Yes  Sexual activity: has sex with females   Last eye exam: 2020, normal  Exercise: routine   Seatbelt use: yes   Lipid panel:    Lab Results   Component Value Date    CHOL 188 01/19/2016    TRIG 147 01/19/2016    HDL 58 01/19/2016       Living will: yes,   copy on file    Immunization History   Administered Date(s) Administered    Influenza Virus Vaccine 12/16/2013, 01/19/2016    Influenza, Quadv, IM, PF (6 mo and older Fluzone, Flulaval, Fluarix, and 3 yrs and older Afluria) 01/16/2017, 12/19/2017    Tdap (Boostrix, Adacel) 06/19/2013       No Known Allergies  Outpatient Medications Marked as Taking for the 2/13/20 encounter (Office Visit) with Claude Edward MD   Medication Sig Dispense Refill    Turmeric 500 MG CAPS Take by mouth      ibuprofen (ADVIL;MOTRIN) 200 MG tablet Take 400 mg by mouth every 6 hours as needed for Pain prn      lisinopril-hydrochlorothiazide (PRINZIDE;ZESTORETIC) 20-25 MG per tablet Take 1 tablet by mouth daily 90 tablet 3    Cholecalciferol (VITAMIN D3) 2000 UNITS CAPS Take by mouth         Past Medical History:   Diagnosis Date    Back pain     history of    Basal cell cancer 2008    history of, right scapula, right eyelid, mid back,    Benign prostatic hypertrophy     history of    Cancer of lower lip     pre cancer per dermatologist    Carotid bruit     history of    Chondromalacia     External hemorrhoid     history of    Fibrous histiocytoma     history of, left ankle    Hearing deficit     Hypertension     Left hip pain     with arthritis    Numbness and tingling     of arms    Rectal bleeding     history of    Right knee pain     history of    Right shoulder pain     history of    Ulnar neuropathy     history of     Past Surgical History:   Procedure Laterality Date    COLONOSCOPY  2005    normal    COLONOSCOPY  3/2015    hemorrhoids    KNEE SURGERY Right 1981    MOHS SURGERY Right 09/18/2008    wide exc ba cell ca with z plasty right scapula    MOHS SURGERY Right 12/13/2011    right eyelid basal cell cancer    MOHS SURGERY Left 1/28/15    left upper nose basal cell carcinoman Dr Omid Camacho Bilateral 1997   Lynette Villarreal Left 01/30/2019 1/30/2019 [address] [facility] Dr Melody Mccarthy Right 2009    VASECTOMY       Family History   Problem Relation Age of Onset    Stroke Father     Cancer Father         Stomach    Other Other         carotid artery disease     Social History     Socioeconomic History    Marital status:      Spouse name: Not on file    Number of children: Not on file    Years of education: Not on file sal removal Tympanic membrane, external ear and ear canal normal.   Nose: Nose normal.   Mouth/Throat: Oropharynx is clear and moist and mucous membranes are normal. No oropharyngeal exudate or posterior oropharyngeal erythema. He has no cervical adenopathy. Eyes: Conjunctivae and extraocular motions are normal. Pupils are equal, round, and reactive to light. Neck: Full passive range of motion without pain. Neck supple. No JVD present. Carotid bruit is not present. No mass and no thyromegaly present. Cardiovascular: Normal rate, regular rhythm, normal heart sounds and intact distal pulses. Exam reveals no gallop and no friction rub. No murmur heard. Pulmonary/Chest: Effort normal and breath sounds normal. No respiratory distress. He has no wheezes, rhonchi or rales. Abdominal: Soft, non-tender. Bowel sounds and aorta are normal. There is no organomegaly, mass or bruit. Genitourinary:  Deferred . Musculoskeletal: decreased range of motion of shoulders, no synovitis. He exhibits no edema. Neurological: He is alert and oriented to person, place, and time. He has normal reflexes. No cranial nerve deficit. Coordination normal.   Skin: Skin is warm, dry and intact. No suspicious lesions are noted. Psychiatric: He has a normal mood and affect.  His speech is normal and behavior is normal. Judgment, cognition and memory are normal.     Assessment/Plan:  Patient Active Problem List   Diagnosis    Hemorrhoid    Essential hypertension    Malignant basal cell neoplasm of skin    Arthritis    Benign non-nodular prostatic hyperplasia with lower urinary tract symptoms       Flu shot today   Labs per orders  To continue to work on healthy diet and fitness  Informed of shingle shot and future   To follow with dermatology  Hearing improved after removal of cerumen  As long as well follow up 1 year and prn

## 2020-02-14 ENCOUNTER — TELEPHONE (OUTPATIENT)
Dept: FAMILY MEDICINE CLINIC | Age: 60
End: 2020-02-14

## 2020-02-25 ENCOUNTER — OFFICE VISIT (OUTPATIENT)
Dept: FAMILY MEDICINE CLINIC | Age: 60
End: 2020-02-25
Payer: COMMERCIAL

## 2020-02-25 VITALS
SYSTOLIC BLOOD PRESSURE: 120 MMHG | BODY MASS INDEX: 27.2 KG/M2 | HEIGHT: 70 IN | WEIGHT: 190 LBS | OXYGEN SATURATION: 98 % | TEMPERATURE: 97.9 F | DIASTOLIC BLOOD PRESSURE: 60 MMHG | HEART RATE: 72 BPM

## 2020-02-25 PROCEDURE — 99213 OFFICE O/P EST LOW 20 MIN: CPT | Performed by: NURSE PRACTITIONER

## 2020-02-25 PROCEDURE — 3017F COLORECTAL CA SCREEN DOC REV: CPT | Performed by: NURSE PRACTITIONER

## 2020-02-25 PROCEDURE — 1036F TOBACCO NON-USER: CPT | Performed by: NURSE PRACTITIONER

## 2020-02-25 PROCEDURE — G8482 FLU IMMUNIZE ORDER/ADMIN: HCPCS | Performed by: NURSE PRACTITIONER

## 2020-02-25 PROCEDURE — G8419 CALC BMI OUT NRM PARAM NOF/U: HCPCS | Performed by: NURSE PRACTITIONER

## 2020-02-25 PROCEDURE — G8427 DOCREV CUR MEDS BY ELIG CLIN: HCPCS | Performed by: NURSE PRACTITIONER

## 2020-02-25 RX ORDER — AMOXICILLIN AND CLAVULANATE POTASSIUM 875; 125 MG/1; MG/1
1 TABLET, FILM COATED ORAL 2 TIMES DAILY
Qty: 20 TABLET | Refills: 0 | Status: SHIPPED | OUTPATIENT
Start: 2020-02-25 | End: 2020-03-06

## 2020-02-25 RX ORDER — PREDNISONE 20 MG/1
20 TABLET ORAL 2 TIMES DAILY
Qty: 14 TABLET | Refills: 0 | Status: SHIPPED | OUTPATIENT
Start: 2020-02-25 | End: 2020-03-03

## 2020-02-25 RX ORDER — FLUTICASONE PROPIONATE 50 MCG
2 SPRAY, SUSPENSION (ML) NASAL DAILY
Qty: 1 BOTTLE | Refills: 0 | Status: SHIPPED | OUTPATIENT
Start: 2020-02-25 | End: 2021-07-07

## 2020-02-25 NOTE — PATIENT INSTRUCTIONS
Patient Education        Sinusitis: Care Instructions  Your Care Instructions    Sinusitis is an infection of the lining of the sinus cavities in your head. Sinusitis often follows a cold. It causes pain and pressure in your head and face. In most cases, sinusitis gets better on its own in 1 to 2 weeks. But some mild symptoms may last for several weeks. Sometimes antibiotics are needed. Follow-up care is a key part of your treatment and safety. Be sure to make and go to all appointments, and call your doctor if you are having problems. It's also a good idea to know your test results and keep a list of the medicines you take. How can you care for yourself at home? · Take an over-the-counter pain medicine, such as acetaminophen (Tylenol), ibuprofen (Advil, Motrin), or naproxen (Aleve). Read and follow all instructions on the label. · If the doctor prescribed antibiotics, take them as directed. Do not stop taking them just because you feel better. You need to take the full course of antibiotics. · Be careful when taking over-the-counter cold or flu medicines and Tylenol at the same time. Many of these medicines have acetaminophen, which is Tylenol. Read the labels to make sure that you are not taking more than the recommended dose. Too much acetaminophen (Tylenol) can be harmful. · Breathe warm, moist air from a steamy shower, a hot bath, or a sink filled with hot water. Avoid cold, dry air. Using a humidifier in your home may help. Follow the directions for cleaning the machine. · Use saline (saltwater) nasal washes to help keep your nasal passages open and wash out mucus and bacteria. You can buy saline nose drops at a grocery store or drugstore. Or you can make your own at home by adding 1 teaspoon of salt and 1 teaspoon of baking soda to 2 cups of distilled water. If you make your own, fill a bulb syringe with the solution, insert the tip into your nostril, and squeeze gently. Darcie Haus your nose.   · Put a hot, wet towel or a warm gel pack on your face 3 or 4 times a day for 5 to 10 minutes each time. · Try a decongestant nasal spray like oxymetazoline (Afrin). Do not use it for more than 3 days in a row. Using it for more than 3 days can make your congestion worse. When should you call for help? Call your doctor now or seek immediate medical care if:    · You have new or worse swelling or redness in your face or around your eyes.     · You have a new or higher fever.    Watch closely for changes in your health, and be sure to contact your doctor if:    · You have new or worse facial pain.     · The mucus from your nose becomes thicker (like pus) or has new blood in it.     · You are not getting better as expected. Where can you learn more? Go to https://Targeted Growthpemulueb.Streem. org and sign in to your InfoReach account. Enter E112 in the VenueSpot box to learn more about \"Sinusitis: Care Instructions. \"     If you do not have an account, please click on the \"Sign Up Now\" link. Current as of: July 28, 2019  Content Version: 12.3  © 1121-7591 [employer]. Care instructions adapted under license by [facility] ([facility]). If you have questions about a medical condition or this instruction, always ask your healthcare professional. [place] [address] any warranty or liability for your use of this information. Patient Education        Saline Nasal Washes: Care Instructions  Your Care Instructions  Saline nasal washes help keep the nasal passages open by washing out thick or dried mucus. This simple remedy can help relieve symptoms of allergies, sinusitis, and colds. It also can make the nose feel more comfortable by keeping the mucous membranes moist. You may notice a little burning sensation in your nose the first few times you use the solution, but this usually gets better in a few days. Follow-up care is a key part of your treatment and safety.  Be sure to make and go to Incorporated disclaims any warranty or liability for your use of this information. Patient Education        Laryngitis: Care Instructions  Your Care Instructions    Laryngitis is an inflammation of the voice box (larynx) that causes your voice to become raspy or hoarse. It can be short-lived or long-lasting. Most of the time, laryngitis comes on quickly and lasts as long as 2 weeks. It is caused by overuse, irritation, or infection of the vocal cords inside the larynx. Some of the most common causes are a cold, the flu, or allergies. Loud talking, shouting, cheering, or singing also can cause laryngitis. Stomach acid that backs up into the throat also can make you lose your voice. Resting your voice and taking other steps at home can help you get your voice back. Follow-up care is a key part of your treatment and safety. Be sure to make and go to all appointments, and call your doctor if you are having problems. It's also a good idea to know your test results and keep a list of the medicines you take. How can you care for yourself at home? · Follow your doctor's directions for treating the condition that caused you to lose your voice. If your doctor prescribed antibiotics, take them as directed. Do not stop taking them just because you feel better. You need to take the full course of antibiotics. · Before you use cough and cold medicines, check the label. They may not be safe for young children or for people with certain health problems. · Try to keep stomach acid from backing up into your throat. Do not eat just before bedtime. Reduce the amount of coffee and alcohol you drink, and eat healthy foods. Taking over-the-counter acid reducers can help when these steps are not enough. In some cases, you may need prescription medicine. · Rest your voice. You do not have to stop speaking, but use your voice as little as possible.  Speak softly but do not whisper; whispering can bother your larynx more than speaking softly. Avoid talking on the telephone or trying to speak loudly. · Try not to clear your throat. This can cause more irritation of your larynx. Take an over-the-counter cough suppressant (if your doctor recommends it) if you have a dry cough that does not produce mucus. · Do not smoke or allow others to smoke around you. If you need help quitting, talk to your doctor about stop-smoking programs and medicines. These can increase your chances of quitting for good. · Use a humidifier or vaporizer to add moisture to your bedroom. Humidity helps to thin the mucus in the nasal membranes that causes stuffiness or postnasal drip. Follow the directions for cleaning the machine. · Drink plenty of fluids, enough so that your urine is light yellow or clear like water. If you have kidney, heart, or liver disease and have to limit fluids, talk with your doctor before you increase the amount of fluids you drink. · Use saline (saltwater) nasal washes to help keep your nasal passages open and wash out mucus and bacteria. You can buy saline nose drops at a grocery store or drugstore. Or, you can make your own at home by mixing ½ teaspoon salt, 1 cup water (at room temperature), and ½ teaspoon baking soda. If you make your own, fill a bulb syringe with the solution, insert the tip into your nostril, and squeeze gently. Euna Jacky your nose. When should you call for help? Call 911 anytime you think you may need emergency care. For example, call if:    · You have trouble breathing.    Call your doctor now or seek immediate medical care if:    · You have new or worse pain.     · You have trouble swallowing.    Watch closely for changes in your health, and be sure to contact your doctor if:    · You do not get better as expected. Where can you learn more? Go to https://chpepiceweb.Nabto. org and sign in to your Qlusters account.  Enter Z068 in the Rattle box to learn more about \"Laryngitis: Care Instructions. \"     If you do not have an account, please click on the \"Sign Up Now\" link. Current as of: July 28, 2019  Content Version: 12.3  © 6890-2643 Healthwise, [employer]. Care instructions adapted under license by [facility]. If you have questions about a medical condition or this instruction, always ask your healthcare professional. [address] any warranty or liability for your use of this information. Patient Education        Cough: Care Instructions  Your Care Instructions    A cough is your body's response to something that bothers your throat or airways. Many things can cause a cough. You might cough because of a cold or the flu, bronchitis, or asthma. Smoking, postnasal drip, allergies, and stomach acid that backs up into your throat also can cause coughs. A cough is a symptom, not a disease. Most coughs stop when the cause, such as a cold, goes away. You can take a few steps at home to cough less and feel better. Follow-up care is a key part of your treatment and safety. Be sure to make and go to all appointments, and call your doctor if you are having problems. It's also a good idea to know your test results and keep a list of the medicines you take. How can you care for yourself at home? · Drink lots of water and other fluids. This helps thin the mucus and soothes a dry or sore throat. Honey or lemon juice in hot water or tea may ease a dry cough. · Take cough medicine as directed by your doctor. · Prop up your head on pillows to help you breathe and ease a dry cough. · Try cough drops to soothe a dry or sore throat. Cough drops don't stop a cough. Medicine-flavored cough drops are no better than candy-flavored drops or hard candy. · Do not smoke. Avoid secondhand smoke. If you need help quitting, talk to your doctor about stop-smoking programs and medicines. These can increase your chances of quitting for good. When should you call for help?   Call [phone]

## 2020-02-25 NOTE — PROGRESS NOTES
normal, left ear canal normal, right ear canal normal, nose without deformity, nasal mucosa and turbinates very congested, pharynx with mild erythema, sinuses tender  Neck: supple and non-tender without mass, no thyromegaly or thyroid nodules, with mild anterior cervical lymphadenopathy  Pulmonary/Chest: clear to auscultation bilaterally- no wheezes, rales or rhonchi, normal air movement, no respiratory distress, no cough noted  Cardiovascular: normal rate, regular rhythm, normal S1 and S2, no audible murmurs, rubs, or clicks, distal pulses intact  Abdomen: soft, non-tender, non-distended, normal bowel sounds, no masses or organomegaly  Extremities: no cyanosis, no clubbing, no edema  Musculoskeletal: normal range of motion, no joint swelling, no obvious bony deformity, no tenderness  Neurologic: no acute gross cranial nerve deficit, gait normal, and speech normal  Psychologic: oriented to person, place, and time, appropriate mood and affect for situation    Assessment and Plan:  Visit Diagnoses       Codes    Acute non-recurrent pansinusitis    -  Primary J01.40    Cough     R05    Sinus pressure     J34.89    Laryngitis     J04.0        Orders Placed This Encounter   Medications    amoxicillin-clavulanate (AUGMENTIN) 875-125 MG per tablet     Sig: Take 1 tablet by mouth 2 times daily for 10 days     Dispense:  20 tablet     Refill:  0    predniSONE (DELTASONE) 20 MG tablet     Sig: Take 1 tablet by mouth 2 times daily for 7 days     Dispense:  14 tablet     Refill:  0    fluticasone (FLONASE) 50 MCG/ACT nasal spray     Si sprays by Each Nostril route daily     Dispense:  1 Bottle     Refill:  0       Stop Afrin. Supportive care encouraged -- saline nasal spray/sinus rinses, humidifier, warm salt water gargles prn, and increased fluids. Education provided. Increase fluid intake and rest.  OTC acetaminophen as needed -- follow package instructions. Follow up with PCP, sooner as needed.   Return or go to an urgent care or emergency room if symptoms worsen, fail to improve, or new symptoms arise. The use, risks, benefits, and side effects of prescribed or recommended medications were discussed. All questions were answered and the patient/caregiver voiced understanding.        Electronically signed by CURTIS Gomez, BRIE on 2/25/2020 at 6:18 PM  Internal Medicine

## 2020-05-27 ENCOUNTER — VIRTUAL VISIT (OUTPATIENT)
Dept: UROLOGY | Age: 60
End: 2020-05-27
Payer: COMMERCIAL

## 2020-05-27 VITALS — HEIGHT: 69 IN | WEIGHT: 186 LBS | BODY MASS INDEX: 27.55 KG/M2

## 2020-05-27 PROCEDURE — 3017F COLORECTAL CA SCREEN DOC REV: CPT | Performed by: UROLOGY

## 2020-05-27 PROCEDURE — 99203 OFFICE O/P NEW LOW 30 MIN: CPT | Performed by: UROLOGY

## 2020-05-27 PROCEDURE — G8427 DOCREV CUR MEDS BY ELIG CLIN: HCPCS | Performed by: UROLOGY

## 2020-05-27 SDOH — HEALTH STABILITY: MENTAL HEALTH: HOW OFTEN DO YOU HAVE A DRINK CONTAINING ALCOHOL?: 2-4 TIMES A MONTH

## 2020-05-27 SDOH — HEALTH STABILITY: MENTAL HEALTH: HOW MANY STANDARD DRINKS CONTAINING ALCOHOL DO YOU HAVE ON A TYPICAL DAY?: 1 OR 2

## 2020-05-27 NOTE — PROGRESS NOTES
EOMi  HEENT: neck supple, trachea midline  Lungs: Respiratory effort normal  Abdomen: Soft, non-tender, non-distended, No CVA  FROMx4, no cyanosis clubbing edema    Assessment and Plan      1. Elevated PSA    2. Hypertrophy of prostate with urinary obstruction           Plan: We will observe the elevated pSA for now as the patient is asymtpomatic without risk factors.   Discussed flomax for BPH, but he would like to hold off for now  Follow up 3-6 months with repeat psa

## 2020-07-20 ENCOUNTER — HOSPITAL ENCOUNTER (OUTPATIENT)
Age: 60
Setting detail: SPECIMEN
Discharge: HOME | End: 2020-07-20
Payer: COMMERCIAL

## 2020-07-20 ENCOUNTER — OFFICE VISIT (OUTPATIENT)
Dept: PRIMARY CARE CLINIC | Age: 60
End: 2020-07-20
Payer: COMMERCIAL

## 2020-07-20 VITALS
TEMPERATURE: 96.6 F | RESPIRATION RATE: 18 BRPM | BODY MASS INDEX: 29.44 KG/M2 | SYSTOLIC BLOOD PRESSURE: 140 MMHG | HEART RATE: 60 BPM | WEIGHT: 187.6 LBS | DIASTOLIC BLOOD PRESSURE: 80 MMHG | HEIGHT: 67 IN | OXYGEN SATURATION: 98 %

## 2020-07-20 PROCEDURE — G8427 DOCREV CUR MEDS BY ELIG CLIN: HCPCS | Performed by: NURSE PRACTITIONER

## 2020-07-20 PROCEDURE — 1036F TOBACCO NON-USER: CPT | Performed by: NURSE PRACTITIONER

## 2020-07-20 PROCEDURE — 3017F COLORECTAL CA SCREEN DOC REV: CPT | Performed by: NURSE PRACTITIONER

## 2020-07-20 PROCEDURE — 99213 OFFICE O/P EST LOW 20 MIN: CPT | Performed by: NURSE PRACTITIONER

## 2020-07-20 PROCEDURE — U0003 INFECTIOUS AGENT DETECTION BY NUCLEIC ACID (DNA OR RNA); SEVERE ACUTE RESPIRATORY SYNDROME CORONAVIRUS 2 (SARS-COV-2) (CORONAVIRUS DISEASE [COVID-19]), AMPLIFIED PROBE TECHNIQUE, MAKING USE OF HIGH THROUGHPUT TECHNOLOGIES AS DESCRIBED BY CMS-2020-01-R: HCPCS

## 2020-07-20 PROCEDURE — G8419 CALC BMI OUT NRM PARAM NOF/U: HCPCS | Performed by: NURSE PRACTITIONER

## 2020-07-20 ASSESSMENT — ENCOUNTER SYMPTOMS
RHINORRHEA: 0
NAUSEA: 0
ABDOMINAL PAIN: 0
VOMITING: 0
DIARRHEA: 1
SHORTNESS OF BREATH: 0
WHEEZING: 0
SINUS PRESSURE: 0
SORE THROAT: 1
COUGH: 1
BLOOD IN STOOL: 0

## 2020-07-20 ASSESSMENT — PATIENT HEALTH QUESTIONNAIRE - PHQ9
1. LITTLE INTEREST OR PLEASURE IN DOING THINGS: 0
SUM OF ALL RESPONSES TO PHQ QUESTIONS 1-9: 0
SUM OF ALL RESPONSES TO PHQ9 QUESTIONS 1 & 2: 0
2. FEELING DOWN, DEPRESSED OR HOPELESS: 0
SUM OF ALL RESPONSES TO PHQ QUESTIONS 1-9: 0

## 2020-07-20 NOTE — LETTER
[facility]  [address] [facility]  [place] [address]  Phone: [phone]  Fax: [phone]    SYLVIA Jiménez CNP        July 20, 2020     Patient: Mike Cummings   YOB: 1960   Date of Visit: 7/20/2020       To Whom it May Concern: Massiel Blanco was seen in my clinic on 7/20/2020. May return to work with negative Covid-19 test result and improved symptoms. Test result in 5-7 days. If you have any questions or concerns, please don't hesitate to call.     Sincerely,         SYLVIA Jiménez CNP

## 2020-07-20 NOTE — PROGRESS NOTES
medications for this visit. He has No Known Allergies. Fabiana Pro He  reports that he quit smoking about 21 years ago. His smoking use included cigarettes. He started smoking about 41 years ago. He has a 5.00 pack-year smoking history. He has never used smokeless tobacco.      Objective:    Vitals:    07/20/20 1113   BP: (!) 140/80   Pulse: 60   Resp: 18   Temp: 96.6 °F (35.9 °C)   TempSrc: Tympanic   SpO2: 98%   Weight: 187 lb 9.6 oz (85.1 kg)   Height: 5' 7\" (1.702 m)     Body mass index is 29.38 kg/m². Review of Systems   Constitutional: Positive for appetite change, chills, fatigue and fever. HENT: Positive for sore throat (now resolved). Negative for congestion, postnasal drip, rhinorrhea and sinus pressure. Respiratory: Positive for cough (slight, white sputum). Negative for shortness of breath and wheezing. Cardiovascular: Negative. Negative for chest pain. Gastrointestinal: Positive for diarrhea (1-2 episiodes yesterday). Negative for abdominal pain, blood in stool, nausea and vomiting. Skin: Negative for rash. Neurological: Positive for headaches (mild frontal; now resolved). Physical Exam  Vitals signs and nursing note reviewed. Constitutional:       Appearance: He is well-developed. HENT:      Head: Normocephalic. Jaw: There is normal jaw occlusion. Right Ear: Tympanic membrane, ear canal and external ear normal.      Left Ear: Tympanic membrane, ear canal and external ear normal.      Nose: Nose normal.      Mouth/Throat:      Lips: Pink. Mouth: Mucous membranes are moist.      Pharynx: Uvula midline. Posterior oropharyngeal erythema present. Eyes:      Pupils: Pupils are equal, round, and reactive to light. Neck:      Musculoskeletal: Normal range of motion and neck supple. Cardiovascular:      Rate and Rhythm: Normal rate and regular rhythm. Heart sounds: Normal heart sounds.    Pulmonary:      Effort: Pulmonary effort is normal.      Breath sounds: Normal breath sounds and air entry. Lymphadenopathy:      Cervical: No cervical adenopathy. Skin:     General: Skin is warm and dry. Neurological:      Mental Status: He is alert and oriented to person, place, and time. Psychiatric:         Behavior: Behavior normal.         Thought Content: Thought content normal.       Assessment and Plan:    No results found for this visit on 07/20/20. Diagnosis Orders   1. Person under investigation for COVID-19  Covid-19 Ambulatory   2. Sore throat  Covid-19 Ambulatory   3. Fever, unspecified fever cause  Covid-19 Ambulatory     Self quarantine until negative Covid-19 test result received and improved symptoms. We will call with Covid-19 test results. I recommended tylenol for pain/fever, increase fluid intake, and eating popsicles and jello for comfort. Warm salt water gargles. Use Chloraseptic spray as needed for sore throat. Follow up with PCP if symptoms not improved or worsen. The use, risks, benefits, and side effects of prescribed or recommended medications were discussed. All questions were answered and the patient/caregiver voiced understanding. No orders of the defined types were placed in this encounter.         Electronically signed by SYLVIA Kaplan CNP on 7/20/20 at 11:27 AM EDT

## 2020-07-20 NOTE — PATIENT INSTRUCTIONS
Patient Education        Learning About Coronavirus [phone])  Coronavirus [phone]): Overview  What is coronavirus (DAFLD-72)? The coronavirus disease (COVID-19) is caused by a virus. It is an illness that was first found in [place], [place], in December 2019. It has since spread worldwide. The virus can cause fever, cough, and trouble breathing. In severe cases, it can cause pneumonia and make it hard to breathe without help. It can cause death. Coronaviruses are a large group of viruses. They cause the common cold. They also cause more serious illnesses like Middle East respiratory syndrome (MERS) and severe acute respiratory syndrome (SARS). COVID-19 is caused by a novel coronavirus. That means it's a new type that has not been seen in people before. This virus spreads person-to-person through droplets from coughing and sneezing. It can also spread when you are close to someone who is infected. And it can spread when you touch something that has the virus on it, such as a doorknob or a tabletop. What can you do to protect yourself from coronavirus (COVID-19)? The best way to protect yourself from getting sick is to:  · Avoid areas where there is an outbreak. · Avoid contact with people who may be infected. · Wash your hands often with soap or alcohol-based hand sanitizers. · Avoid crowds and try to stay at least 6 feet away from other people. · Wash your hands often, especially after you cough or sneeze. Use soap and water, and scrub for at least 20 seconds. If soap and water aren't available, use an alcohol-based hand . · Avoid touching your mouth, nose, and eyes. What can you do to avoid spreading the virus to others? To help avoid spreading the virus to others:  · Cover your mouth with a tissue when you cough or sneeze. Then throw the tissue in the trash. · Use a disinfectant to clean things that you touch often. · Wear a cloth face cover if you have to go to public areas.   · Stay home if you are sick or have been exposed to the virus. Don't go to school, work, or public areas. And don't use public transportation, ride-shares, or taxis unless you have no choice. · If you are sick:  ? Leave your home only if you need to get medical care. But call the doctor's office first so they know you're coming. And wear a face cover. ? Wear the face cover whenever you're around other people. It can help stop the spread of the virus when you cough or sneeze. ? Clean and disinfect your home every day. Use household  and disinfectant wipes or sprays. Take special care to clean things that you grab with your hands. These include doorknobs, remote controls, phones, and handles on your refrigerator and microwave. And don't forget countertops, tabletops, bathrooms, and computer keyboards. When to call for help  [id number] anytime you think you may need emergency care. For example, call if:  · You have severe trouble breathing. (You can't talk at all.)  · You have constant chest pain or pressure. · You are severely dizzy or lightheaded. · You are confused or can't think clearly. · Your face and lips have a blue color. · You pass out (lose consciousness) or are very hard to wake up. Call your doctor now if you develop symptoms such as:  · Shortness of breath. · Fever. · Cough. If you need to get care, call ahead to the doctor's office for instructions before you go. Make sure you wear a face cover to prevent exposing other people to the virus. Where can you get the latest information? The following health organizations are tracking and studying this virus. Their websites contain the most up-to-date information. [place] also learn what to do if you think you may have been exposed to the virus. · U.S. Centers for Disease Control and Prevention (CDC): The CDC provides updated news about the disease and travel advice. The website also tells you how to prevent the spread of infection.  www.cdc.gov  · World Health Organization [facility]): WHO offers information about the virus outbreaks. WHO also has travel advice. www.who.int  Current as of: May 8, 2020               Content Version: 12.5  © 2006-2020 Healthwise, Incorporated. Care instructions adapted under license by [facility] ([facility]). If you have questions about a medical condition or this instruction, always ask your healthcare professional. [address] any warranty or liability for your use of this information. Patient Education        Coronavirus (RBPAB-59): Care Instructions  Overview  The coronavirus disease (COVID-19) is caused by a virus. Symptoms may include a fever, a cough, and shortness of breath. It mainly spreads person-to-person through droplets from coughing and sneezing. The virus also can spread when people are in close contact with someone who is infected. Most people have mild symptoms and can take care of themselves at home. If their symptoms get worse, they may need care in a hospital. There is no medicine to fight the virus. It's important to not spread the virus to others. If you have COVID-19, wear a face cover anytime you are around other people. You need to isolate yourself while you are sick. Your doctor or local public health official will tell you when you no longer need to be isolated. Leave your home only if you need to get medical care. Follow-up care is a key part of your treatment and safety. Be sure to make and go to all appointments, and call your doctor if you are having problems. It's also a good idea to know your test results and keep a list of the medicines you take. How can you care for yourself at home? · Get extra rest. It can help you feel better. · Drink plenty of fluids. This helps replace fluids lost from fever. Fluids also help ease a scratchy throat. Water, soup, fruit juice, and hot tea with lemon are good choices. · Take acetaminophen (such as Tylenol) to reduce a fever.  It may also help with muscle aches. Read and follow all instructions on the label. · Sponge your body with lukewarm water to help with fever. Don't use cold water or ice. · Use petroleum jelly on sore skin. This can help if the skin around your nose and lips becomes sore from rubbing a lot with tissues. Tips for isolation  · Wear a cloth face cover when you are around other people. It can help stop the spread of the virus when you cough or sneeze. · Limit contact with people in your home. If possible, stay in a separate bedroom and use a separate bathroom. · If you have to leave home, avoid crowds and try to stay at least 6 feet away from other people. · Avoid contact with pets and other animals. · Cover your mouth and nose with a tissue when you cough or sneeze. Then throw it in the trash right away. · Wash your hands often, especially after you cough or sneeze. Use soap and water, and scrub for at least 20 seconds. If soap and water aren't available, use an alcohol-based hand . · Don't share personal household items. These include bedding, towels, cups and glasses, and eating utensils. · [address] in the warmest water allowed for the fabric type, and dry it completely. It's okay to wash other people's laundry with yours. · Clean and disinfect your home every day. Use household  and disinfectant wipes or sprays. Take special care to clean things that you grab with your hands. These include doorknobs, remote controls, phones, and handles on your refrigerator and microwave. And don't forget countertops, tabletops, bathrooms, and computer keyboards. When should you call for help? [id number] anytime you think you may need emergency care. For example, call if you have life-threatening symptoms, such as:  · You have severe trouble breathing. (You can't talk at all.)  · You have constant chest pain or pressure. · You are severely dizzy or lightheaded. · You are confused or can't think clearly.   · Your lower in the morning than it is later in the day. It may go up during hot weather or when you exercise, wear heavy clothes, or take a hot bath. Your temperature may also be different depending on how you take it. A temperature taken in the mouth (oral) or under the arm may be a little lower than your core temperature (rectal). What is a fever temperature? A core temperature of 100.4°F or above is considered a fever. What can cause a fever? A fever may be caused by:  · Infections. This is the most common cause of a fever. Examples of infections that can cause a fever include the flu, a kidney infection, or pneumonia. · Some medicines. · Severe trauma or injury, such as a heart attack, stroke, heatstroke, or burns. · Other medical conditions, such as arthritis and some cancers. How can you treat a fever at home? · Ask your doctor if you can take an over-the-counter pain medicine, such as acetaminophen (Tylenol), ibuprofen (Advil, Motrin), or naproxen (Aleve). Be safe with medicines. Read and follow all instructions on the label. · To prevent dehydration, drink plenty of fluids. Choose water and other caffeine-free clear liquids until you feel better. If you have kidney, heart, or liver disease and have to limit fluids, talk with your doctor before you increase the amount of fluids you drink. Follow-up care is a key part of your treatment and safety. Be sure to make and go to all appointments, and call your doctor if you are having problems. It's also a good idea to know your test results and keep a list of the medicines you take. Where can you learn more? Go to https://[employer]. org and sign in to your [employer] account. Enter D466 in the [employer] box to learn more about \"Learning About Fever. \"     If you do not have an account, please click on the \"Sign Up Now\" link. Current as of: June 26, 2019               Content Version: 12.5  © 7293-7009 Healthwise, Incorporated. Care instructions adapted under license by [facility] ([facility]). If you have questions about a medical condition or this instruction, always ask your healthcare professional. [place] [address] any warranty or liability for your use of this information. Patient Education        Sore Throat: Care Instructions  Your Care Instructions     Infection by bacteria or a virus causes most sore throats. Cigarette smoke, dry air, air pollution, allergies, and yelling can also cause a sore throat. Sore throats can be painful and annoying. Fortunately, most sore throats go away on their own. If you have a bacterial infection, your doctor may prescribe antibiotics. Follow-up care is a key part of your treatment and safety. Be sure to make and go to all appointments, and call your doctor if you are having problems. It's also a good idea to know your test results and keep a list of the medicines you take. How can you care for yourself at home? · If your doctor prescribed antibiotics, take them as directed. Do not stop taking them just because you feel better. You need to take the full course of antibiotics. · Gargle with warm salt water once an hour to help reduce swelling and relieve discomfort. Use 1 teaspoon of salt mixed in 1 cup of warm water. · Take an over-the-counter pain medicine, such as acetaminophen (Tylenol), ibuprofen (Advil, Motrin), or naproxen (Aleve). Read and follow all instructions on the label. · Be careful when taking over-the-counter cold or flu medicines and Tylenol at the same time. Many of these medicines have acetaminophen, which is Tylenol. Read the labels to make sure that you are not taking more than the recommended dose. Too much acetaminophen (Tylenol) can be harmful. · Drink plenty of fluids. Fluids may help soothe an irritated throat. Hot fluids, such as tea or soup, may help decrease throat pain. · Use over-the-counter throat lozenges to soothe pain.  Regular cough drops or hard candy may also help. These should not be given to young children because of the risk of choking. · Do not smoke or allow others to smoke around you. If you need help quitting, talk to your doctor about stop-smoking programs and medicines. These can increase your chances of quitting for good. · Use a vaporizer or humidifier to add moisture to your bedroom. Follow the directions for cleaning the machine. When should you call for help? Call your doctor now or seek immediate medical care if:  · You have new or worse trouble swallowing. · Your sore throat gets much worse on one side. Watch closely for changes in your health, and be sure to contact your doctor if you do not get better as expected. Where can you learn more? Go to https://[employer]. org and sign in to your [employer] account. Enter Q717 in the [employer] box to learn more about \"Sore Throat: Care Instructions. \"     If you do not have an account, please click on the \"Sign Up Now\" link. Current as of: July 29, 2019               Content Version: 12.5  © 7722-8599 Healthwise, Incorporated. Care instructions adapted under license by [facility] ([facility]). If you have questions about a medical condition or this instruction, always ask your healthcare professional. [address]  any warranty or liability for your use of this information.

## 2020-07-24 LAB — SARS-COV-2, NAA: NOT DETECTED

## 2020-10-12 ENCOUNTER — OFFICE VISIT (OUTPATIENT)
Dept: FAMILY MEDICINE CLINIC | Age: 60
End: 2020-10-12
Payer: COMMERCIAL

## 2020-10-12 VITALS
HEART RATE: 56 BPM | OXYGEN SATURATION: 99 % | SYSTOLIC BLOOD PRESSURE: 132 MMHG | BODY MASS INDEX: 30.85 KG/M2 | DIASTOLIC BLOOD PRESSURE: 76 MMHG | TEMPERATURE: 98.7 F | WEIGHT: 197 LBS | RESPIRATION RATE: 16 BRPM

## 2020-10-12 PROCEDURE — G8427 DOCREV CUR MEDS BY ELIG CLIN: HCPCS | Performed by: NURSE PRACTITIONER

## 2020-10-12 PROCEDURE — G8482 FLU IMMUNIZE ORDER/ADMIN: HCPCS | Performed by: NURSE PRACTITIONER

## 2020-10-12 PROCEDURE — 90686 IIV4 VACC NO PRSV 0.5 ML IM: CPT | Performed by: NURSE PRACTITIONER

## 2020-10-12 PROCEDURE — 99212 OFFICE O/P EST SF 10 MIN: CPT | Performed by: NURSE PRACTITIONER

## 2020-10-12 PROCEDURE — G8417 CALC BMI ABV UP PARAM F/U: HCPCS | Performed by: NURSE PRACTITIONER

## 2020-10-12 PROCEDURE — 3017F COLORECTAL CA SCREEN DOC REV: CPT | Performed by: NURSE PRACTITIONER

## 2020-10-12 PROCEDURE — 90471 IMMUNIZATION ADMIN: CPT | Performed by: NURSE PRACTITIONER

## 2020-10-12 PROCEDURE — 1036F TOBACCO NON-USER: CPT | Performed by: NURSE PRACTITIONER

## 2020-10-12 PROCEDURE — 69209 REMOVE IMPACTED EAR WAX UNI: CPT | Performed by: NURSE PRACTITIONER

## 2020-10-12 ASSESSMENT — ENCOUNTER SYMPTOMS
COUGH: 0
SORE THROAT: 0
RHINORRHEA: 0

## 2020-10-12 NOTE — PATIENT INSTRUCTIONS
Follow up with primary care provider in 1 to 2 days if needed. Patient Education        Earwax Blockage: Care Instructions  Your Care Instructions     Earwax is a natural substance that protects the ear canal. Normally, earwax drains from the ears and does not cause problems. Sometimes earwax builds up and hardens. Earwax blockage (also called cerumen impaction) can cause some loss of hearing and pain. When wax is tightly packed, you will need to have your doctor remove it. Follow-up care is a key part of your treatment and safety. Be sure to make and go to all appointments, and call your doctor if you are having problems. It's also a good idea to know your test results and keep a list of the medicines you take. How can you care for yourself at home? · Do not try to remove earwax with cotton swabs, fingers, or other objects. This can make the blockage worse and damage the eardrum. · If your doctor recommends that you try to remove earwax at home:  ? Soften and loosen the earwax with warm mineral oil. You also can try hydrogen peroxide mixed with an equal amount of room temperature water. Place 2 drops of the fluid, warmed to body temperature, in the ear two times a day for up to 5 days. ? Once the wax is loose and soft, all that is usually needed to remove it from the ear canal is a gentle, warm shower. Direct the water into the ear, then tip your head to let the earwax drain out. Dry your ear thoroughly with a hair dryer set on low. Hold the dryer several inches from your ear. ? If the warm mineral oil and shower do not work, use an over-the-counter wax softener. Read and follow all instructions on the label. After using the wax softener, use an ear syringe to gently flush the ear. Make sure the flushing solution is body temperature. Cool or hot fluids in the ear can cause dizziness. When should you call for help?    Call your doctor now or seek immediate medical care if:    · Pus or blood drains from your ear.     · Your ears are ringing or feel full.     · You have a loss of hearing. Watch closely for changes in your health, and be sure to contact your doctor if:    · You have pain or reduced hearing after 1 week of home treatment.     · You have any new symptoms, such as nausea or balance problems. Where can you learn more? Go to [email or web address] and sign in to your [employer] account. Enter X376 in the [employer] box to learn more about \"Earwax Blockage: Care Instructions. \"     If you do not have an account, please click on the \"Sign Up Now\" link. Current as of: June 26, 2019               Content Version: 12.6  © 0066-6020 [employer]. Care instructions adapted under license by [facility] ([facility]). If you have questions about a medical condition or this instruction, always ask your healthcare professional. [place] [address] any warranty or liability for your use of this information. Patient Education        Influenza (Flu) Vaccine (Inactivated or Recombinant): What You Need to Know  Why get vaccinated? Influenza vaccine can prevent influenza (flu). Flu is a contagious disease that spreads around the [facility] every year, usually between October and May. Anyone can get the flu, but it is more dangerous for some people. Infants and young children, people 72years of age and older, pregnant women, and people with certain health conditions or a weakened immune system are at greatest risk of flu complications. Pneumonia, bronchitis, sinus infections and ear infections are examples of flu-related complications. If you have a medical condition, such as heart disease, cancer or diabetes, flu can make it worse. Flu can cause fever and chills, sore throat, muscle aches, fatigue, cough, headache, and runny or stuffy nose. Some people may have vomiting and diarrhea, though this is more common in children than adults.   Each year, thousands of people in the [facility] die from flu, and many more are hospitalized. Flu vaccine prevents millions of illnesses and flu-related visits to the doctor each year. Influenza vaccine  CDC recommends everyone 10months of age and older get vaccinated every flu season. Children 6 months through 6years of age may need 2 doses during a single flu season. Everyone else needs only 1 dose each flu season. It takes about 2 weeks for protection to develop after vaccination. There are many flu viruses, and they are always changing. Each year a new flu vaccine is made to protect against three or four viruses that are likely to cause disease in the upcoming flu season. Even when the vaccine doesn't exactly match these viruses, it may still provide some protection. Influenza vaccine does not cause flu. Influenza vaccine may be given at the same time as other vaccines. Talk with your health care provider  Tell your vaccine provider if the person getting the vaccine:  · Has had an allergic reaction after a previous dose of influenza vaccine, or has any severe, life-threatening allergies. · Has ever had Guillain-Barré Syndrome (also called GBS). In some cases, your health care provider may decide to postpone influenza vaccination to a future visit. People with minor illnesses, such as a cold, may be vaccinated. People who are moderately or severely ill should usually wait until they recover before getting influenza vaccine. Your health care provider can give you more information. Risks of a vaccine reaction  · Soreness, redness, and swelling where shot is given, fever, muscle aches, and headache can happen after influenza vaccine. · There may be a very small increased risk of Guillain-Barré Syndrome (GBS) after inactivated influenza vaccine (the flu shot).   [facility] children who get the flu shot along with pneumococcal vaccine (PCV13), and/or DTaP vaccine at the same time might be slightly more likely to have a seizure caused by fever. Tell your health care provider if a child who is getting flu vaccine has ever had a seizure. People sometimes faint after medical procedures, including vaccination. Tell your provider if you feel dizzy or have vision changes or ringing in the ears. As with any medicine, there is a very remote chance of a vaccine causing a severe allergic reaction, other serious injury, or death. What if there is a serious problem? An allergic reaction could occur after the vaccinated person leaves the clinic. If you see signs of a severe allergic reaction (hives, swelling of the face and throat, difficulty breathing, a fast heartbeat, dizziness, or weakness), call 9-1-1 and get the person to the nearest hospital.  For other signs that concern you, call your health care provider. Adverse reactions should be reported to the Vaccine Adverse Event Reporting System (VAERS). Your health care provider will usually file this report, or you can do it yourself. Visit the VAERS website at www.vaers. hhs.gov or call 9-440.409.7762. VAERS is only for reporting reactions, and VAERS staff do not give medical advice. The National Vaccine Injury Compensation Program  The National Vaccine Injury Compensation Program (VICP) is a federal program that was created to compensate people who may have been injured by certain vaccines. Visit the VICP website at www.hrsa.gov/vaccinecompensation or call 2-341.437.8091 to learn about the program and about filing a claim. There is a time limit to file a claim for compensation. How can I learn more? · Ask your healthcare provider. · Call your local or state health department. · Contact the Centers for Disease Control and Prevention (CDC):  ? Call 9-260.329.1130 (6-323-ZPK-INFO) or  ? Visit CDC's website at www.cdc.gov/flu  Vaccine Information Statement (Interim)  Inactivated Influenza Vaccine  8/15/2019  42 DEMOND Sierra 324NR-03  [facility]

## 2020-10-12 NOTE — PROGRESS NOTES
[address] [place] [address] [address] [facility], APRN-CNP  [address]  Phone:  [phone]  Fax:  [phone]  Shashank Sevilla is a 63 y.o. male who presents today for his medical conditions/complaints as noted below. Salvador BUCHANAN Houts c/o of Cerumen Impaction (right ear past week) and Flu Vaccine      HPI:     Otalgia    There is pain in the right ear. This is a new problem. The current episode started in the past 7 days (3 days). The problem has been gradually worsening. The pain is at a severity of 1/10. Associated symptoms include hearing loss. Pertinent negatives include no coughing, ear discharge, headaches, rhinorrhea or sore throat. He has tried ear drops for the symptoms. The treatment provided no relief.  frequent cerumen blockages       Wt Readings from Last 3 Encounters:   10/12/20 197 lb (89.4 kg)   07/20/20 187 lb 9.6 oz (85.1 kg)   05/27/20 186 lb (84.4 kg)       Temp Readings from Last 3 Encounters:   10/12/20 98.7 °F (37.1 °C)   07/20/20 96.6 °F (35.9 °C) (Tympanic)   02/25/20 97.9 °F (36.6 °C) (Tympanic)       BP Readings from Last 3 Encounters:   10/12/20 132/76   07/20/20 (!) 140/80   02/25/20 120/60       Pulse Readings from Last 3 Encounters:   10/12/20 56   07/20/20 60   02/25/20 72              Past Medical History:   Diagnosis Date    Back pain     history of    Basal cell cancer 2008    history of, right scapula, right eyelid, mid back,    Benign prostatic hypertrophy     history of    Cancer of lower lip     pre cancer per dermatologist    Carotid bruit     history of    Chondromalacia     External hemorrhoid     history of    Fibrous histiocytoma     history of, left ankle    Hearing deficit     Hypertension     Left hip pain     with arthritis    Numbness and tingling     of arms    Rectal bleeding     history of    Right knee pain     history of    Right shoulder pain     history of    Ulnar neuropathy     history of      Past Surgical History: Procedure Laterality Date    COLONOSCOPY      normal    COLONOSCOPY  3/2015    hemorrhoids    KNEE SURGERY Right     MOHS SURGERY Right 2008    wide exc ba cell ca with z plasty right scapula    MOHS SURGERY Right 2011    right eyelid basal cell cancer    MOHS SURGERY Left 1/28/15    left upper nose basal cell carcinoman Dr Lazara Ash Left 2019 [address] [facility] Right     VASECTOMY       Family History   Problem Relation Age of Onset    Stroke Father     Cancer Father         Stomach    Other Other         carotid artery disease     Social History     Tobacco Use    Smoking status: Former Smoker     Packs/day: 0.50     Years: 10.00     Pack years: 5.00     Types: Cigarettes     Start date:      Last attempt to quit:      Years since quittin.7    Smokeless tobacco: Never Used   Substance Use Topics    Alcohol use: Yes     Alcohol/week: 0.0 standard drinks     Frequency: 2-4 times a month     Drinks per session: 1 or 2     Binge frequency: Never     Comment: 2 per week      Current Outpatient Medications   Medication Sig Dispense Refill    fluticasone (FLONASE) 50 MCG/ACT nasal spray 2 sprays by Each Nostril route daily 1 Bottle 0    lisinopril-hydrochlorothiazide (PRINZIDE;ZESTORETIC) 20-25 MG per tablet Take 1 tablet by mouth daily 90 tablet 3    Turmeric 500 MG CAPS Take by mouth      ibuprofen (ADVIL;MOTRIN) 200 MG tablet Take 400 mg by mouth every 6 hours as needed for Pain prn      Cholecalciferol (VITAMIN D3) 2000 UNITS CAPS Take by mouth       No current facility-administered medications for this visit. No Known Allergies    No exam data present    Subjective:      Review of Systems   HENT: Positive for ear pain and hearing loss. Negative for ear discharge, rhinorrhea and sore throat. Respiratory: Negative for cough.     Neurological: idea to know your test results and keep a list of the medicines you take. How can you care for yourself at home? · Do not try to remove earwax with cotton swabs, fingers, or other objects. This can make the blockage worse and damage the eardrum. · If your doctor recommends that you try to remove earwax at home:  ? Soften and loosen the earwax with warm mineral oil. You also can try hydrogen peroxide mixed with an equal amount of room temperature water. Place 2 drops of the fluid, warmed to body temperature, in the ear two times a day for up to 5 days. ? Once the wax is loose and soft, all that is usually needed to remove it from the ear canal is a gentle, warm shower. Direct the water into the ear, then tip your head to let the earwax drain out. Dry your ear thoroughly with a hair dryer set on low. Hold the dryer several inches from your ear. ? If the warm mineral oil and shower do not work, use an over-the-counter wax softener. Read and follow all instructions on the label. After using the wax softener, use an ear syringe to gently flush the ear. Make sure the flushing solution is body temperature. Cool or hot fluids in the ear can cause dizziness. When should you call for help? Call your doctor now or seek immediate medical care if:    · Pus or blood drains from your ear.     · Your ears are ringing or feel full.     · You have a loss of hearing. Watch closely for changes in your health, and be sure to contact your doctor if:    · You have pain or reduced hearing after 1 week of home treatment.     · You have any new symptoms, such as nausea or balance problems. Where can you learn more? Go to [email or web address]://[email or web address] org and sign in to your [employer] account. Enter W665 in the "[employer] box to learn more about \"Earwax Blockage: Care Instructions. \"     If you do not have an account, please click on the \"Sign Up Now\" link.   Current as of: June 26, 2019               Content Version: 12.6  © 7269-0033 [employer]. Care instructions adapted under license by [facility] ([facility]). If you have questions about a medical condition or this instruction, always ask your healthcare professional. [address] any warranty or liability for your use of this information. Patient Education        Influenza (Flu) Vaccine (Inactivated or Recombinant): What You Need to Know  Why get vaccinated? Influenza vaccine can prevent influenza (flu). Flu is a contagious disease that spreads around the [facility] every year, usually between October and May. Anyone can get the flu, but it is more dangerous for some people. Infants and young children, people 72years of age and older, pregnant women, and people with certain health conditions or a weakened immune system are at greatest risk of flu complications. Pneumonia, bronchitis, sinus infections and ear infections are examples of flu-related complications. If you have a medical condition, such as heart disease, cancer or diabetes, flu can make it worse. Flu can cause fever and chills, sore throat, muscle aches, fatigue, cough, headache, and runny or stuffy nose. Some people may have vomiting and diarrhea, though this is more common in children than adults. Each year, thousands of people in the [facility] die from flu, and many more are hospitalized. Flu vaccine prevents millions of illnesses and flu-related visits to the doctor each year. Influenza vaccine  CDC recommends everyone 10months of age and older get vaccinated every flu season. Children 6 months through 6years of age may need 2 doses during a single flu season. Everyone else needs only 1 dose each flu season. It takes about 2 weeks for protection to develop after vaccination. There are many flu viruses, and they are always changing.  Each year a new flu vaccine is made to protect against three or four viruses that are likely to cause disease in the upcoming flu season. Even when the vaccine doesn't exactly match these viruses, it may still provide some protection. Influenza vaccine does not cause flu. Influenza vaccine may be given at the same time as other vaccines. Talk with your health care provider  Tell your vaccine provider if the person getting the vaccine:  · Has had an allergic reaction after a previous dose of influenza vaccine, or has any severe, life-threatening allergies. · Has ever had Guillain-Barré Syndrome (also called GBS). In some cases, your health care provider may decide to postpone influenza vaccination to a future visit. People with minor illnesses, such as a cold, may be vaccinated. People who are moderately or severely ill should usually wait until they recover before getting influenza vaccine. Your health care provider can give you more information. Risks of a vaccine reaction  · Soreness, redness, and swelling where shot is given, fever, muscle aches, and headache can happen after influenza vaccine. · There may be a very small increased risk of Guillain-Barré Syndrome (GBS) after inactivated influenza vaccine (the flu shot). [facility] children who get the flu shot along with pneumococcal vaccine (PCV13), and/or DTaP vaccine at the same time might be slightly more likely to have a seizure caused by fever. Tell your health care provider if a child who is getting flu vaccine has ever had a seizure. People sometimes faint after medical procedures, including vaccination. Tell your provider if you feel dizzy or have vision changes or ringing in the ears. As with any medicine, there is a very remote chance of a vaccine causing a severe allergic reaction, other serious injury, or death. What if there is a serious problem? An allergic reaction could occur after the vaccinated person leaves the clinic.  If you see signs of a severe allergic reaction (hives, swelling of the face and throat, difficulty breathing, a fast heartbeat, dizziness, or weakness), call 9-1-1 and get the person to the nearest hospital.  For other signs that concern you, call your health care provider. Adverse reactions should be reported to the Vaccine Adverse Event Reporting System (VAERS). Your health care provider will usually file this report, or you can do it yourself. Visit the VAERS website at www.vaers. hhs.gov or call 4-622.763.6975. VAERS is only for reporting reactions, and VAERS staff do not give medical advice. The National Vaccine Injury Compensation Program  The National Vaccine Injury Compensation Program (VICP) is a federal program that was created to compensate people who may have been injured by certain vaccines. Visit the VICP website at www.hrsa.gov/vaccinecompensation or call 0-222.889.3209 to learn about the program and about filing a claim. There is a time limit to file a claim for compensation. How can I learn more? · Ask your healthcare provider. · Call your local or state health department. · Contact the Centers for Disease Control and Prevention (CDC):  ? Call 3-517.315.3240 (5-044-CUT-INFO) or  ? Visit CDC's website at www.cdc.gov/flu  Vaccine Information Statement (Interim)  Inactivated Influenza Vaccine  8/15/2019  42 [address] [place] 042EE-20  Department of Health and Human Services  Centers for Disease Control and Prevention  Many Vaccine Information Statements are available in Vietnamese and other languages. See www.immunize.org/vis. Muchas hojas de información sobre vacunas están disponibles en español y en otros idiomas. Visite www.immunize.org/vis. Care instructions adapted under license by [facility] ([facility]). If you have questions about a medical condition or this instruction, always ask your healthcare professional. [place] [address] any warranty or liability for your use of this information. Patient/Caregiver instructed on use, benefit, and side effects of prescribed medications.   All patient/parent/caregiver questions answered. Patient/parent/caregiver voiced understanding. Reviewed health maintenance. Instructed to continue current medications, diet and exercise. Patient agreed with treatment plan. Follow up as directed.            Electronically signed by SYLVIA Beverly NP on10/12/2020

## 2020-11-25 ENCOUNTER — OFFICE VISIT (OUTPATIENT)
Dept: UROLOGY | Age: 60
End: 2020-11-25
Payer: COMMERCIAL

## 2020-11-25 VITALS
SYSTOLIC BLOOD PRESSURE: 122 MMHG | WEIGHT: 190.2 LBS | HEIGHT: 67 IN | DIASTOLIC BLOOD PRESSURE: 64 MMHG | BODY MASS INDEX: 29.85 KG/M2

## 2020-11-25 LAB — DIAGNOSTIC PSA: 2.69 NG/ML (ref 0–4)

## 2020-11-25 PROCEDURE — 3017F COLORECTAL CA SCREEN DOC REV: CPT | Performed by: UROLOGY

## 2020-11-25 PROCEDURE — 99213 OFFICE O/P EST LOW 20 MIN: CPT | Performed by: UROLOGY

## 2020-11-25 PROCEDURE — G8427 DOCREV CUR MEDS BY ELIG CLIN: HCPCS | Performed by: UROLOGY

## 2020-11-25 PROCEDURE — G8417 CALC BMI ABV UP PARAM F/U: HCPCS | Performed by: UROLOGY

## 2020-11-25 PROCEDURE — G8482 FLU IMMUNIZE ORDER/ADMIN: HCPCS | Performed by: UROLOGY

## 2020-11-25 PROCEDURE — 1036F TOBACCO NON-USER: CPT | Performed by: UROLOGY

## 2020-11-25 RX ORDER — TAMSULOSIN HYDROCHLORIDE 0.4 MG/1
0.4 CAPSULE ORAL DAILY
Qty: 30 CAPSULE | Refills: 3 | Status: SHIPPED | OUTPATIENT
Start: 2020-11-25 | End: 2021-02-18

## 2020-11-25 NOTE — PROGRESS NOTES
Dr. Fernando Benjmain MD  [facility] Consultation / New Patient Visit    Patient:  Mechelle Her  YOB: 1960  Date: 11/25/2020  Consult requested from Isaias Morris MD     HISTORY OF PRESENT ILLNESS:   The patient is a 63 y.o. male who presents today for follow-up for the following problem(s): elevated PSA  Overall the problem(s) : are worsening. Associated Symptoms: No dysuria, gross hematuria. Pain Severity: Pain Score:   0 - No pain    Today visit:   11/25/20   Lu Stewart presents for history of elevated PSA velocity. No PSA obtained today. AUASS: 9/1, worst symptom is weak stream.  He states these symptoms are stable. 5/27/20  HPI: elevated PSA  Labs reviewed  Old records reviewed  Problem: worsening  Duration: 1 year  Pain: 0/10  Imaging reviewed: none  Family History of prostate cancer? none  Recent history of urinary tract infection/prostatitis? none  Recent catheterization? none  Recent acute urinary retention? none  Lower urinary tract symptoms: weak stream, frequency and urgency,  Hematuria: none  Back and Bone pain: none  Previous prostate biopsy? none      Summary of old records:   (Patient's old records, notes and chart reviewed and summarized above.)    Last several PSA's:  Lab Results   Component Value Date    PSA 2.60 02/13/2020    PSA 1.56 05/14/2018    PSA 2.66 02/07/2018       Last total testosterone:  No results found for: TESTOSTERONE    Urinalysis today:  No results found for this visit on 11/25/20.       Last BUN and creatinine:  Lab Results   Component Value Date    BUN 16 02/13/2020     Lab Results   Component Value Date    CREATININE 0.94 02/13/2020       Imaging Reviewed during this Office Visit:   (results were independently reviewed by physician and radiology report verified)    PAST MEDICAL, FAMILY AND SOCIAL HISTORY:  Past Medical History:   Diagnosis Date    Back pain     history of    Basal cell cancer 2008    history of, right scapula, right Left a second voice message to discuss genetic referrals. Per JUAN Davila, pt will need to speak with Ria Benjamin regarding a referral and this will need prior authorization from insurance, unable to do \"an add-on\" to labs that are being done. Will discuss with patient.    eyelid, mid back,    Benign prostatic hypertrophy     history of    Cancer of lower lip     pre cancer per dermatologist    Carotid bruit     history of    Chondromalacia     External hemorrhoid     history of    Fibrous histiocytoma     history of, left ankle    Hearing deficit     Hypertension     Left hip pain     with arthritis    Numbness and tingling     of arms    Rectal bleeding     history of    Right knee pain     history of    Right shoulder pain     history of    Ulnar neuropathy     history of     Past Surgical History:   Procedure Laterality Date    COLONOSCOPY  2005    normal    COLONOSCOPY  3/2015    hemorrhoids    KNEE SURGERY Right 1981    MOHS SURGERY Right 09/18/2008    wide exc ba cell ca with z plasty right scapula    MOHS SURGERY Right 12/13/2011    right eyelid basal cell cancer    MOHS SURGERY Left 1/28/15    left upper nose basal cell carcinoman Dr Jorge Reeder Left 01/30/2019 1/30/2019 [address] [facility] [address] Gissel Sánchez Right 2009    VASECTOMY       Family History   Problem Relation Age of Onset    Stroke Father     Cancer Father         Stomach    Other Other         carotid artery disease     Outpatient Medications Marked as Taking for the 11/25/20 encounter (Office Visit) with Ye Bustos MD   Medication Sig Dispense Refill    fluticasone (FLONASE) 50 MCG/ACT nasal spray 2 sprays by Each Nostril route daily 1 Bottle 0    lisinopril-hydrochlorothiazide (PRINZIDE;ZESTORETIC) 20-25 MG per tablet Take 1 tablet by mouth daily 90 tablet 3    Turmeric 500 MG CAPS Take by mouth      ibuprofen (ADVIL;MOTRIN) 200 MG tablet Take 400 mg by mouth every 6 hours as needed for Pain prn      Cholecalciferol (VITAMIN D3) 2000 UNITS CAPS Take by mouth         Patient has no known allergies.   Social History     Tobacco Use   Smoking Status Former Smoker    Packs/day: 0.50    Years: 10.00    Pack years: 5.00    Types: Cigarettes    Start date: 5    Last attempt to quit:     Years since quittin.9   Smokeless Tobacco Never Used       Social History     Substance and Sexual Activity   Alcohol Use Yes    Alcohol/week: 0.0 standard drinks    Frequency: 2-4 times a month    Drinks per session: 1 or 2    Binge frequency: Never    Comment: 2 per week       REVIEW OF SYSTEMS:  Constitutional: negative  Eyes: negative  Respiratory: negative  Cardiovascular: negative  Gastrointestinal: negative  Musculoskeletal: negative  Genitourinary: negative  Skin: negative   Neurological: negative  Hematological/Lymphatic: negative  Psychological: negative    Physical Exam:    This a 63 y.o. male   Vitals:    20 0818   BP: 122/64     Constitutional: Patient in no acute distress   Neuro: alert and oriented to person place and time. Psych: Mood and affect normal.  Head: atraumatic normocephalic  Eyes: EOMi  HEENT: neck supple, trachea midline  Lungs: Respiratory effort normal  Abdomen: Soft, non-tender, non-distended, No CVA  FROMx4, no cyanosis clubbing edema    Assessment and Plan      1. Hypertrophy of prostate with urinary obstruction    2. Elevated PSA           Plan: We will observe the elevated pSA for now as the patient is asymtpomatic without risk factors.   Discussed flomax for BPH, but he would like to hold off for now  Follow up 3-6 months with repeat psa    6-8 weeks with med check

## 2021-01-06 ENCOUNTER — OFFICE VISIT (OUTPATIENT)
Dept: UROLOGY | Age: 61
End: 2021-01-06
Payer: COMMERCIAL

## 2021-01-06 VITALS
WEIGHT: 190 LBS | BODY MASS INDEX: 29.82 KG/M2 | SYSTOLIC BLOOD PRESSURE: 117 MMHG | HEART RATE: 52 BPM | OXYGEN SATURATION: 98 % | HEIGHT: 67 IN | DIASTOLIC BLOOD PRESSURE: 62 MMHG

## 2021-01-06 DIAGNOSIS — N40.1 HYPERTROPHY OF PROSTATE WITH URINARY OBSTRUCTION: Primary | ICD-10-CM

## 2021-01-06 DIAGNOSIS — N13.8 HYPERTROPHY OF PROSTATE WITH URINARY OBSTRUCTION: Primary | ICD-10-CM

## 2021-01-06 DIAGNOSIS — R97.20 ELEVATED PSA: ICD-10-CM

## 2021-01-06 PROCEDURE — G8417 CALC BMI ABV UP PARAM F/U: HCPCS | Performed by: UROLOGY

## 2021-01-06 PROCEDURE — G8482 FLU IMMUNIZE ORDER/ADMIN: HCPCS | Performed by: UROLOGY

## 2021-01-06 PROCEDURE — 99213 OFFICE O/P EST LOW 20 MIN: CPT | Performed by: UROLOGY

## 2021-01-06 PROCEDURE — 3017F COLORECTAL CA SCREEN DOC REV: CPT | Performed by: UROLOGY

## 2021-01-06 PROCEDURE — 1036F TOBACCO NON-USER: CPT | Performed by: UROLOGY

## 2021-01-06 PROCEDURE — G8427 DOCREV CUR MEDS BY ELIG CLIN: HCPCS | Performed by: UROLOGY

## 2021-01-06 NOTE — PROGRESS NOTES
Dr. Shauna Maki MD  [facility] Consultation / New Patient Visit    Patient:  Chaim Lester  YOB: 1960  Date: 1/6/2021  Consult requested from Na Meneses MD     HISTORY OF PRESENT ILLNESS:   The patient is a 63 y.o. male who presents today for follow-up for the following problem(s): elevated PSA  Overall the problem(s) : are worsening. Associated Symptoms: No dysuria, gross hematuria. Pain Severity: Pain Score:   0 - No pain    Today visit:   1/6/21   Nahum Bernard presents for history of BPH with LUTs and elevated PSA  Tried flomax with minimal improvement, and had retrograde ejaculation, stopped taking. AUASS: 7/1. PSA reveiwed: stable at 2.69    11/25/20   Nahum Bernard presents for history of elevated PSA velocity. No PSA obtained today. AUASS: 9/1, worst symptom is weak stream.  He states these symptoms are stable. 5/27/20  HPI: elevated PSA  Labs reviewed  Old records reviewed  Problem: worsening  Duration: 1 year  Pain: 0/10  Imaging reviewed: none  Family History of prostate cancer? none  Recent history of urinary tract infection/prostatitis? none  Recent catheterization? none  Recent acute urinary retention? none  Lower urinary tract symptoms: weak stream, frequency and urgency,  Hematuria: none  Back and Bone pain: none  Previous prostate biopsy? none      Summary of old records:   (Patient's old records, notes and chart reviewed and summarized above.)    Last several PSA's:  Lab Results   Component Value Date    PSA 2.60 02/13/2020    PSA 1.56 05/14/2018    PSA 2.66 02/07/2018       Last total testosterone:  No results found for: TESTOSTERONE    Urinalysis today:  No results found for this visit on 01/06/21.       Last BUN and creatinine:  Lab Results   Component Value Date    BUN 16 02/13/2020     Lab Results   Component Value Date    CREATININE 0.94 02/13/2020       Imaging Reviewed during this Office Visit:   (results were independently reviewed by physician and radiology report verified)    PAST MEDICAL, FAMILY AND SOCIAL HISTORY:  Past Medical History:   Diagnosis Date    Back pain     history of    Basal cell cancer     history of, right scapula, right eyelid, mid back,    Benign prostatic hypertrophy     history of    Cancer of lower lip     pre cancer per dermatologist    Carotid bruit     history of    Chondromalacia     External hemorrhoid     history of    Fibrous histiocytoma     history of, left ankle    Hearing deficit     Hypertension     Left hip pain     with arthritis    Numbness and tingling     of arms    Rectal bleeding     history of    Right knee pain     history of    Right shoulder pain     history of    Ulnar neuropathy     history of     Past Surgical History:   Procedure Laterality Date    COLONOSCOPY      normal    COLONOSCOPY  3/2015    hemorrhoids    KNEE SURGERY Right     MOHS SURGERY Right 2008    wide exc ba cell ca with z plasty right scapula    MOHS SURGERY Right 2011    right eyelid basal cell cancer    MOHS SURGERY Left 1/28/15    left upper nose basal cell carcinoman Dr Tyrone Lehman Bilateral    Henrine Soumya Left 2019 [address] [address] [facility] [address] Eliu Haque Right     VASECTOMY       Family History   Problem Relation Age of Onset    Stroke Father     Cancer Father         Stomach    Other Other         carotid artery disease     No outpatient medications have been marked as taking for the 21 encounter (Office Visit) with Cony Singh MD.       Patient has no known allergies.   Social History     Tobacco Use   Smoking Status Former Smoker    Packs/day: 0.50    Years: 10.00    Pack years: 5.00    Types: Cigarettes    Start date:     Quit date:     Years since quittin.0   Smokeless Tobacco Never Used       Social History     Substance and Sexual Activity   Alcohol Use Yes    Alcohol/week: 0.0 standard drinks    Frequency: 2-4 times a month    Drinks per session: 1 or 2    Binge frequency: Never    Comment: 2 per week       REVIEW OF SYSTEMS:  Constitutional: negative  Eyes: negative  Respiratory: negative  Cardiovascular: negative  Gastrointestinal: negative  Musculoskeletal: negative  Genitourinary: negative  Skin: negative   Neurological: negative  Hematological/Lymphatic: negative  Psychological: negative    Physical Exam:    This a 63 y.o. male   Vitals:    01/06/21 1007   BP: 117/62   Pulse: 52   SpO2: 98%     Constitutional: Patient in no acute distress   Neuro: alert and oriented to person place and time. Psych: Mood and affect normal.  Head: atraumatic normocephalic  Eyes: EOMi  HEENT: neck supple, trachea midline  Lungs: Respiratory effort normal  Abdomen: Soft, non-tender, non-distended, No CVA  FROMx4, no cyanosis clubbing edema    Assessment and Plan      1. Hypertrophy of prostate with urinary obstruction    2. Elevated PSA           Plan: We will observe the elevated pSA for now as the patient is asymtpomatic without risk factors.   Follow up 6 months with PSA

## 2021-02-18 ENCOUNTER — OFFICE VISIT (OUTPATIENT)
Dept: FAMILY MEDICINE CLINIC | Age: 61
End: 2021-02-18
Payer: COMMERCIAL

## 2021-02-18 ENCOUNTER — HOSPITAL ENCOUNTER (OUTPATIENT)
Age: 61
Setting detail: SPECIMEN
Discharge: HOME | End: 2021-02-18
Payer: COMMERCIAL

## 2021-02-18 VITALS
SYSTOLIC BLOOD PRESSURE: 132 MMHG | WEIGHT: 189 LBS | OXYGEN SATURATION: 99 % | BODY MASS INDEX: 29.6 KG/M2 | HEART RATE: 53 BPM | DIASTOLIC BLOOD PRESSURE: 68 MMHG

## 2021-02-18 DIAGNOSIS — Z00.00 WELLNESS EXAMINATION: ICD-10-CM

## 2021-02-18 DIAGNOSIS — C44.91 MALIGNANT BASAL CELL NEOPLASM OF SKIN: ICD-10-CM

## 2021-02-18 DIAGNOSIS — N40.1 BENIGN NON-NODULAR PROSTATIC HYPERPLASIA WITH LOWER URINARY TRACT SYMPTOMS: ICD-10-CM

## 2021-02-18 DIAGNOSIS — M19.90 ARTHRITIS: ICD-10-CM

## 2021-02-18 DIAGNOSIS — I10 ESSENTIAL HYPERTENSION: ICD-10-CM

## 2021-02-18 DIAGNOSIS — E55.9 VITAMIN D DEFICIENCY: ICD-10-CM

## 2021-02-18 DIAGNOSIS — K64.9 HEMORRHOIDS, UNSPECIFIED HEMORRHOID TYPE: ICD-10-CM

## 2021-02-18 DIAGNOSIS — K64.9 HEMORRHOIDS, UNSPECIFIED HEMORRHOID TYPE: Primary | ICD-10-CM

## 2021-02-18 DIAGNOSIS — R73.01 IMPAIRED FASTING GLUCOSE: ICD-10-CM

## 2021-02-18 LAB
-: NORMAL
ABSOLUTE EOS #: 0.1 K/UL (ref 0–0.44)
ABSOLUTE IMMATURE GRANULOCYTE: <0.03 K/UL (ref 0–0.3)
ABSOLUTE LYMPH #: 1.24 K/UL (ref 1.1–3.7)
ABSOLUTE MONO #: 0.49 K/UL (ref 0.1–1.2)
ALBUMIN SERPL-MCNC: 4.1 G/DL (ref 3.5–5.2)
ALBUMIN/GLOBULIN RATIO: 1.4 (ref 1–2.5)
ALP BLD-CCNC: 49 U/L (ref 40–129)
ALT SERPL-CCNC: 10 U/L (ref 5–41)
AMORPHOUS: NORMAL
ANION GAP SERPL CALCULATED.3IONS-SCNC: 9 MMOL/L (ref 9–17)
AST SERPL-CCNC: 14 U/L
BACTERIA: NORMAL
BASOPHILS # BLD: 1 % (ref 0–2)
BASOPHILS ABSOLUTE: 0.04 K/UL (ref 0–0.2)
BILIRUB SERPL-MCNC: 0.45 MG/DL (ref 0.3–1.2)
BILIRUBIN URINE: NEGATIVE
BUN BLDV-MCNC: 17 MG/DL (ref 8–23)
BUN/CREAT BLD: 18 (ref 9–20)
CALCIUM SERPL-MCNC: 10 MG/DL (ref 8.6–10.4)
CASTS UA: NORMAL /LPF (ref 0–2)
CHLORIDE BLD-SCNC: 102 MMOL/L (ref 98–107)
CHOLESTEROL/HDL RATIO: 2.8
CHOLESTEROL: 213 MG/DL
CO2: 29 MMOL/L (ref 20–31)
COLOR: NORMAL
COMMENT UA: NORMAL
CREAT SERPL-MCNC: 0.96 MG/DL (ref 0.7–1.2)
CRYSTALS, UA: NORMAL /HPF
DIFFERENTIAL TYPE: NORMAL
EOSINOPHILS RELATIVE PERCENT: 2 % (ref 1–4)
EPITHELIAL CELLS UA: NORMAL /HPF (ref 0–5)
GFR AFRICAN AMERICAN: >60 ML/MIN
GFR NON-AFRICAN AMERICAN: >60 ML/MIN
GFR SERPL CREATININE-BSD FRML MDRD: ABNORMAL ML/MIN/{1.73_M2}
GFR SERPL CREATININE-BSD FRML MDRD: ABNORMAL ML/MIN/{1.73_M2}
GLUCOSE BLD-MCNC: 106 MG/DL (ref 70–99)
GLUCOSE URINE: NEGATIVE
HCT VFR BLD CALC: 45 % (ref 40.7–50.3)
HDLC SERPL-MCNC: 76 MG/DL
HEMOGLOBIN: 14.4 G/DL (ref 13–17)
IMMATURE GRANULOCYTES: 0 %
KETONES, URINE: NEGATIVE
LDL CHOLESTEROL: 117 MG/DL (ref 0–130)
LEUKOCYTE ESTERASE, URINE: NEGATIVE
LYMPHOCYTES # BLD: 27 % (ref 24–43)
MCH RBC QN AUTO: 28.9 PG (ref 25.2–33.5)
MCHC RBC AUTO-ENTMCNC: 32 G/DL (ref 25.2–33.5)
MCV RBC AUTO: 90.2 FL (ref 82.6–102.9)
MONOCYTES # BLD: 11 % (ref 3–12)
MUCUS: NORMAL
NITRITE, URINE: NEGATIVE
NRBC AUTOMATED: 0 PER 100 WBC
OTHER OBSERVATIONS UA: NORMAL
PDW BLD-RTO: 13.8 % (ref 11.8–14.4)
PH UA: 6 (ref 5–6)
PLATELET # BLD: 233 K/UL (ref 138–453)
PLATELET ESTIMATE: NORMAL
PMV BLD AUTO: 10.1 FL (ref 8.1–13.5)
POTASSIUM SERPL-SCNC: 4.5 MMOL/L (ref 3.7–5.3)
PROTEIN UA: NEGATIVE
RBC # BLD: 4.99 M/UL (ref 4.21–5.77)
RBC # BLD: NORMAL 10*6/UL
RBC UA: NORMAL /HPF (ref 0–4)
RENAL EPITHELIAL, UA: NORMAL /HPF
SEG NEUTROPHILS: 59 % (ref 36–65)
SEGMENTED NEUTROPHILS ABSOLUTE COUNT: 2.68 K/UL (ref 1.5–8.1)
SODIUM BLD-SCNC: 140 MMOL/L (ref 135–144)
SPECIFIC GRAVITY UA: 1.02 (ref 1.01–1.02)
THYROXINE, FREE: 1.59 NG/DL (ref 0.93–1.7)
TOTAL PROTEIN: 7.1 G/DL (ref 6.4–8.3)
TRICHOMONAS: NORMAL
TRIGL SERPL-MCNC: 100 MG/DL
TSH SERPL DL<=0.05 MIU/L-ACNC: 1.31 MIU/L (ref 0.3–5)
TURBIDITY: NORMAL
URINE HGB: NEGATIVE
UROBILINOGEN, URINE: NORMAL
VITAMIN D 25-HYDROXY: 73.2 NG/ML (ref 30–100)
VLDLC SERPL CALC-MCNC: ABNORMAL MG/DL (ref 1–30)
WBC # BLD: 4.6 K/UL (ref 3.5–11.3)
WBC # BLD: NORMAL 10*3/UL
WBC UA: NORMAL /HPF (ref 0–4)
YEAST: NORMAL

## 2021-02-18 PROCEDURE — 84443 ASSAY THYROID STIM HORMONE: CPT

## 2021-02-18 PROCEDURE — 99214 OFFICE O/P EST MOD 30 MIN: CPT | Performed by: INTERNAL MEDICINE

## 2021-02-18 PROCEDURE — 83036 HEMOGLOBIN GLYCOSYLATED A1C: CPT

## 2021-02-18 PROCEDURE — G8427 DOCREV CUR MEDS BY ELIG CLIN: HCPCS | Performed by: INTERNAL MEDICINE

## 2021-02-18 PROCEDURE — 84439 ASSAY OF FREE THYROXINE: CPT

## 2021-02-18 PROCEDURE — 3017F COLORECTAL CA SCREEN DOC REV: CPT | Performed by: INTERNAL MEDICINE

## 2021-02-18 PROCEDURE — 80061 LIPID PANEL: CPT

## 2021-02-18 PROCEDURE — 80053 COMPREHEN METABOLIC PANEL: CPT

## 2021-02-18 PROCEDURE — 82306 VITAMIN D 25 HYDROXY: CPT

## 2021-02-18 PROCEDURE — G8482 FLU IMMUNIZE ORDER/ADMIN: HCPCS | Performed by: INTERNAL MEDICINE

## 2021-02-18 PROCEDURE — 1036F TOBACCO NON-USER: CPT | Performed by: INTERNAL MEDICINE

## 2021-02-18 PROCEDURE — 81001 URINALYSIS AUTO W/SCOPE: CPT

## 2021-02-18 PROCEDURE — G8417 CALC BMI ABV UP PARAM F/U: HCPCS | Performed by: INTERNAL MEDICINE

## 2021-02-18 PROCEDURE — 85025 COMPLETE CBC W/AUTO DIFF WBC: CPT

## 2021-02-18 PROCEDURE — 36415 COLL VENOUS BLD VENIPUNCTURE: CPT

## 2021-02-18 RX ORDER — LISINOPRIL AND HYDROCHLOROTHIAZIDE 25; 20 MG/1; MG/1
1 TABLET ORAL DAILY
Qty: 90 TABLET | Refills: 5 | Status: SHIPPED | OUTPATIENT
Start: 2021-02-18 | End: 2022-02-17 | Stop reason: SDUPTHER

## 2021-02-18 RX ORDER — LISINOPRIL AND HYDROCHLOROTHIAZIDE 25; 20 MG/1; MG/1
1 TABLET ORAL DAILY
Qty: 30 TABLET | Refills: 0 | Status: SHIPPED | OUTPATIENT
Start: 2021-02-18 | End: 2021-07-07 | Stop reason: ALTCHOICE

## 2021-02-18 ASSESSMENT — PATIENT HEALTH QUESTIONNAIRE - PHQ9
1. LITTLE INTEREST OR PLEASURE IN DOING THINGS: 0
SUM OF ALL RESPONSES TO PHQ QUESTIONS 1-9: 0
SUM OF ALL RESPONSES TO PHQ9 QUESTIONS 1 & 2: 0

## 2021-02-18 NOTE — PROGRESS NOTES
[address]  [address]  Dept: [phone]  Dept Fax: [phone]: [phone]     Visit Date:  2/18/2021    Patient:  Ava Branch  YOB: 1960    HPI:   Ava Branch presents today for   Chief Complaint   Patient presents with    Annual Exam     patient is here today for annual wellness and to have his ears checked he said they feel full of wax.  Cerumen Impaction   . HPI 63year old male with a history of BPH with LUTS and elevated PSA, hypertension, history of hemorrhoids on is coming in for an annual wellness examination as well as to get refills on his medications. Hypertension blood pressure today 132/68 with a heart rate around 53 does not check his blood pressure at home he denies any chest pain cardiac pulmonary related problems today. He has been compliant with his medications. Denies any shortness of breath chest tightness wheezing lightheadedness dizziness leg edema orthopnea PND-like symptoms today. BPH with LUTS/elevated PSA-PSA levels checked was around 2.69. He follows up with urology on a regular basis denies any worsening episodes of dribbling hesitancy decrease in flow stream or any other UTI or pyelonephritis or worsening urinary retention-like symptoms today. Bilateral cerumen impaction-would like get his ears checked today he reports having on and off episodes of ear wax buildup in his ears and have muffled ear sounds/decreased hearing sometimes. No ear pain ear discharge or any ringing in the ears. Right groin Pain-having right groin pain several months was told the past maybe it is radiating from his hip. He feels like it is more like a muscular strain/pull and has been using massage ice heat/stretching therapy and seems to be doing better he reports no scrotal swelling or testicular swelling or any lumps or bumps concerning for an inguinal hernia that he has noticed for any falls traumas injuries or rashes around that area. No low back pain or posterior buttock pain. The pain is very much localized and nonradiating. Health Maintenance   Topic Date Due    Shingles Vaccine (1 of 2) 06/14/2009    Diabetes screen  01/19/2019    Flu vaccine (1) 09/01/2019    Potassium monitoring  11/06/2020    Creatinine monitoring  11/06/2020    Lipid screen  01/19/2021    DTaP/Tdap/Td vaccine (2 - Td) 06/19/2023    Colon cancer screen colonoscopy  03/25/2025    Hepatitis C screen  Completed    HIV screen  Completed    Hepatitis A vaccine  Aged Out    Hepatitis B vaccine  Aged Out    Hib vaccine  Aged Out    Meningococcal (ACWY) vaccine  Aged Out    Pneumococcal 0-64 years Vaccine        Influenza Virus Vaccine 12/16/2013, 01/19/2016     Influenza, Quadv, IM, PF (6 mo and older Fluzone, Flulaval, Fluarix, and 3 yrs and older Afluria) 01/16/2017, 12/19/2017    Tdap (Boostrix, Adacel) 06/19/2013       Medications  Prior to Visit Medications    Medication Sig Taking?  Authorizing Provider   lisinopril-hydrochlorothiazide (PRINZIDE;ZESTORETIC) 20-25 MG per tablet Take 1 tablet by mouth daily Yes Na Meneses MD   Turmeric 500 MG CAPS Take by mouth Yes Historical Provider, MD   ibuprofen (ADVIL;MOTRIN) 200 MG tablet Take 400 mg by mouth every 6 hours as needed for Pain prn Yes Historical Provider, MD   Cholecalciferol (VITAMIN D3) 2000 UNITS CAPS Take by mouth Yes Historical Provider, MD   fluticasone (FLONASE) 50 MCG/ACT nasal spray 2 sprays by Each Nostril route daily Patient not taking: Reported on 2/18/2021  SYLVIA Apple CNP        Allergies:  has No Known Allergies. Past Medical History:   has a past medical history of Back pain, Basal cell cancer, Benign prostatic hypertrophy, Cancer of lower lip, Carotid bruit, Chondromalacia, External hemorrhoid, Fibrous histiocytoma, Hearing deficit, Hypertension, Left hip pain, Numbness and tingling, Rectal bleeding, Right knee pain, Right shoulder pain, and Ulnar neuropathy. Past Surgical History   has a past surgical history that includes knee surgery (Right, 1981); Rotator cuff repair (Bilateral, 1997); Vasectomy; Total knee arthroplasty (Right, 2009); Mohs surgery (Right, 09/18/2008); Mohs surgery (Right, 12/13/2011); Mohs surgery (Left, 1/28/15); Colonoscopy (2005); Colonoscopy (3/2015); and Total shoulder arthroplasty (Left, 01/30/2019). Family History  family history includes Cancer in his father; Other in an other family member; Stroke in his father. Social History   reports that he quit smoking about 22 years ago. His smoking use included cigarettes. He started smoking about 42 years ago. He has a 5.00 pack-year smoking history. He has never used smokeless tobacco. He reports current alcohol use. He reports that he does not use drugs.     Health Maintenance:    Health Maintenance   Topic Date Due    Potassium monitoring  02/18/2022    Creatinine monitoring  02/18/2022    DTaP/Tdap/Td vaccine (2 - Td) 06/19/2023    Diabetes screen  02/18/2024    Colon cancer screen colonoscopy  03/25/2025    Lipid screen  02/18/2026    Flu vaccine  Completed    Shingles Vaccine  Completed    Hepatitis C screen  Completed    HIV screen  Completed    Hepatitis A vaccine  Aged Out    Hepatitis B vaccine  Aged Out    Hib vaccine  Aged Out    Meningococcal (ACWY) vaccine  Aged Out    Pneumococcal 0-64 years Vaccine  Aged Out       Subjective:      Review of Systems Constitutional: Negative for fatigue, fever and unexpected weight change. HENT: Negative for ear pain, postnasal drip, rhinorrhea, sinus pain, sore throat and trouble swallowing. Ear fullness   Eyes: Negative for visual disturbance. Respiratory: Negative for cough, chest tightness and shortness of breath. Cardiovascular: Negative for chest pain and leg swelling. Gastrointestinal: Negative for abdominal pain, blood in stool and diarrhea. Endocrine: Negative for polyuria. Genitourinary: Negative for difficulty urinating and flank pain. Musculoskeletal: Negative for arthralgias, joint swelling and myalgias. Right groin pain   Skin: Negative for rash. Allergic/Immunologic: Negative for environmental allergies. Neurological: Negative for weakness, light-headedness, numbness and headaches. Hematological: Negative for adenopathy. Psychiatric/Behavioral: Negative for behavioral problems and suicidal ideas. The patient is not nervous/anxious. Objective:     /68 (Site: Right Upper Arm, Position: Sitting)   Pulse 53   Wt 189 lb (85.7 kg)   SpO2 99%   BMI 29.60 kg/m²     Physical Exam  Vitals signs and nursing note reviewed. Constitutional:       Appearance: Normal appearance. HENT:      Head: Normocephalic and atraumatic. Eyes:      Pupils: Pupils are equal, round, and reactive to light. Neck:      Musculoskeletal: Normal range of motion. Cardiovascular:      Rate and Rhythm: Normal rate and regular rhythm. Pulses: Normal pulses. Heart sounds: Normal heart sounds. No murmur. No friction rub. No gallop. Pulmonary:      Effort: Pulmonary effort is normal. No respiratory distress. Breath sounds: Normal breath sounds. No stridor. No wheezing or rales. Abdominal:      General: Abdomen is flat. Bowel sounds are normal. There is no distension. Palpations: Abdomen is soft. Musculoskeletal: Normal range of motion.    Skin: General: Skin is warm. Neurological:      Mental Status: He is alert and oriented to person, place, and time. Mental status is at baseline. Psychiatric:         Mood and Affect: Mood normal.             Assessment       Diagnosis Orders   1. Hemorrhoids, unspecified hemorrhoid type  lisinopril-hydroCHLOROthiazide (PRINZIDE;ZESTORETIC) 20-25 MG per tablet    lisinopril-hydroCHLOROthiazide (PRINZIDE;ZESTORETIC) 20-25 MG per tablet    CBC With Auto Differential    Comprehensive Metabolic Panel    Lipid Panel    TSH    T4, Free    Urinalysis With Microscopic    Vitamin D 25 Hydroxy    Hemoglobin A1C   2. Essential hypertension  lisinopril-hydroCHLOROthiazide (PRINZIDE;ZESTORETIC) 20-25 MG per tablet    lisinopril-hydroCHLOROthiazide (PRINZIDE;ZESTORETIC) 20-25 MG per tablet    CBC With Auto Differential    Comprehensive Metabolic Panel    Lipid Panel    TSH    T4, Free    Urinalysis With Microscopic    Vitamin D 25 Hydroxy    Hemoglobin A1C   3. Malignant basal cell neoplasm of skin  lisinopril-hydroCHLOROthiazide (PRINZIDE;ZESTORETIC) 20-25 MG per tablet    lisinopril-hydroCHLOROthiazide (PRINZIDE;ZESTORETIC) 20-25 MG per tablet    CBC With Auto Differential    Comprehensive Metabolic Panel    Lipid Panel    TSH    T4, Free    Urinalysis With Microscopic    Vitamin D 25 Hydroxy    Hemoglobin A1C   4. Arthritis  lisinopril-hydroCHLOROthiazide (PRINZIDE;ZESTORETIC) 20-25 MG per tablet    lisinopril-hydroCHLOROthiazide (PRINZIDE;ZESTORETIC) 20-25 MG per tablet    CBC With Auto Differential    Comprehensive Metabolic Panel    Lipid Panel    TSH    T4, Free    Urinalysis With Microscopic    Vitamin D 25 Hydroxy    Hemoglobin A1C   5.  Benign non-nodular prostatic hyperplasia with lower urinary tract symptoms  lisinopril-hydroCHLOROthiazide (PRINZIDE;ZESTORETIC) 20-25 MG per tablet    lisinopril-hydroCHLOROthiazide (PRINZIDE;ZESTORETIC) 20-25 MG per tablet    CBC With Auto Differential Comprehensive Metabolic Panel    Lipid Panel    TSH    T4, Free    Urinalysis With Microscopic    Vitamin D 25 Hydroxy    Hemoglobin A1C   6. Impaired fasting glucose  lisinopril-hydroCHLOROthiazide (PRINZIDE;ZESTORETIC) 20-25 MG per tablet    lisinopril-hydroCHLOROthiazide (PRINZIDE;ZESTORETIC) 20-25 MG per tablet    CBC With Auto Differential    Comprehensive Metabolic Panel    Lipid Panel    TSH    T4, Free    Urinalysis With Microscopic    Vitamin D 25 Hydroxy    Hemoglobin A1C   7. Wellness examination  lisinopril-hydroCHLOROthiazide (PRINZIDE;ZESTORETIC) 20-25 MG per tablet    lisinopril-hydroCHLOROthiazide (PRINZIDE;ZESTORETIC) 20-25 MG per tablet    CBC With Auto Differential    Comprehensive Metabolic Panel    Lipid Panel    TSH    T4, Free    Urinalysis With Microscopic    Vitamin D 25 Hydroxy    Hemoglobin A1C   8. Vitamin D deficiency  lisinopril-hydroCHLOROthiazide (PRINZIDE;ZESTORETIC) 20-25 MG per tablet    lisinopril-hydroCHLOROthiazide (PRINZIDE;ZESTORETIC) 20-25 MG per tablet    CBC With Auto Differential    Comprehensive Metabolic Panel    Lipid Panel    TSH    T4, Free    Urinalysis With Microscopic    Vitamin D 25 Hydroxy    Hemoglobin A1C         PLAN   Refill placed. Fasting labs ordered.      Orders Placed This Encounter   Procedures    CBC With Auto Differential     Standing Status:   Future     Number of Occurrences:   1     Standing Expiration Date:   2/18/2022    Comprehensive Metabolic Panel     Standing Status:   Future     Number of Occurrences:   1     Standing Expiration Date:   2/18/2022    Lipid Panel     Standing Status:   Future     Number of Occurrences:   1     Standing Expiration Date:   2/18/2022     Order Specific Question:   Is Patient Fasting?/# of Hours     Answer:   12 hours    TSH     Standing Status:   Future     Number of Occurrences:   1     Standing Expiration Date:   2/18/2022    T4, Free     Standing Status:   Future     Number of Occurrences:   1 Standing Expiration Date:   2/18/2022    Urinalysis With Microscopic     Standing Status:   Future     Number of Occurrences:   1     Standing Expiration Date:   2/18/2022     Order Specific Question:   SPECIFY(EX-CATH,MIDSTREAM,CYSTO,ETC)? Answer:   MID STREAM    Vitamin D 25 Hydroxy     Standing Status:   Future     Number of Occurrences:   1     Standing Expiration Date:   2/18/2022    Hemoglobin A1C     Standing Status:   Future     Number of Occurrences:   1     Standing Expiration Date:   2/18/2022        No follow-ups on file. Patient given educational materials - see patient instructions. Discussed use, benefit, and side effects of prescribed medications. All patient questions answered. Pt voiced understanding. Reviewed health maintenance.        Electronically signed Arely Liu MD on 2/18/2021 at 8:46 AM EST

## 2021-02-19 LAB
ESTIMATED AVERAGE GLUCOSE: 97 MG/DL
HBA1C MFR BLD: 5 % (ref 4–6)

## 2021-02-19 ASSESSMENT — ENCOUNTER SYMPTOMS
ABDOMINAL PAIN: 0
BLOOD IN STOOL: 0
DIARRHEA: 0
TROUBLE SWALLOWING: 0
SHORTNESS OF BREATH: 0
CHEST TIGHTNESS: 0
RHINORRHEA: 0
SORE THROAT: 0
COUGH: 0
SINUS PAIN: 0

## 2021-06-30 LAB — DIAGNOSTIC PSA: 2.77 NG/ML (ref 0–4)

## 2021-07-01 NOTE — PATIENT INSTRUCTIONS
If you need to reach the Urologist or Urology Nurses for any health care questions or concerns please call [phone] and ask to speak with the [facility]rology Nurse. The phone line is open from 8a-430p Monday thru Friday.

## 2021-07-07 ENCOUNTER — OFFICE VISIT (OUTPATIENT)
Dept: UROLOGY | Age: 61
End: 2021-07-07
Payer: COMMERCIAL

## 2021-07-07 VITALS
OXYGEN SATURATION: 97 % | HEART RATE: 57 BPM | BODY MASS INDEX: 29.66 KG/M2 | HEIGHT: 67 IN | SYSTOLIC BLOOD PRESSURE: 120 MMHG | RESPIRATION RATE: 16 BRPM | WEIGHT: 189 LBS | DIASTOLIC BLOOD PRESSURE: 86 MMHG

## 2021-07-07 DIAGNOSIS — N40.1 HYPERTROPHY OF PROSTATE WITH URINARY OBSTRUCTION: Primary | ICD-10-CM

## 2021-07-07 DIAGNOSIS — R97.20 ELEVATED PSA: ICD-10-CM

## 2021-07-07 DIAGNOSIS — N13.8 HYPERTROPHY OF PROSTATE WITH URINARY OBSTRUCTION: Primary | ICD-10-CM

## 2021-07-07 PROCEDURE — 1036F TOBACCO NON-USER: CPT | Performed by: UROLOGY

## 2021-07-07 PROCEDURE — G8427 DOCREV CUR MEDS BY ELIG CLIN: HCPCS | Performed by: UROLOGY

## 2021-07-07 PROCEDURE — G8417 CALC BMI ABV UP PARAM F/U: HCPCS | Performed by: UROLOGY

## 2021-07-07 PROCEDURE — 99213 OFFICE O/P EST LOW 20 MIN: CPT | Performed by: UROLOGY

## 2021-07-07 PROCEDURE — 3017F COLORECTAL CA SCREEN DOC REV: CPT | Performed by: UROLOGY

## 2021-07-07 NOTE — PROGRESS NOTES
MD MD [facility] Consultation / New Patient Visit    Patient:  Huong Armendariz  YOB: 1960  Date: 7/7/2021  Consult requested from Coleman Swanson MD     HISTORY OF PRESENT ILLNESS:   The patient is a 57 y.o. male who presents today for follow-up for the following problem(s): elevated PSA  Overall the problem(s) : are worsening. Associated Symptoms: No dysuria, gross hematuria. Pain Severity:      Today visit:   7/7/21    Carissa Patino presents for history of BPH with LUTs and elevated PSA  Tried flomax with minimal improvement, and had retrograde ejaculation, stopped taking. AUASS: 7/1. PSA reveiwed: stable at 2.77, (2.69)    11/25/20   Carissa Patino presents for history of elevated PSA velocity. No PSA obtained today. AUASS: 9/1, worst symptom is weak stream.  He states these symptoms are stable. 5/27/20  HPI: elevated PSA  Labs reviewed  Old records reviewed  Problem: worsening  Duration: 1 year  Pain: 0/10  Imaging reviewed: none  Family History of prostate cancer? none  Recent history of urinary tract infection/prostatitis? none  Recent catheterization? none  Recent acute urinary retention? none  Lower urinary tract symptoms: weak stream, frequency and urgency,  Hematuria: none  Back and Bone pain: none  Previous prostate biopsy? none      Summary of old records:   (Patient's old records, notes and chart reviewed and summarized above.)    Last several PSA's:  Lab Results   Component Value Date    PSA 2.60 02/13/2020    PSA 1.56 05/14/2018    PSA 2.66 02/07/2018       Last total testosterone:  No results found for: TESTOSTERONE    Urinalysis today:  No results found for this visit on 07/07/21.       Last BUN and creatinine:  Lab Results   Component Value Date    BUN 17 02/18/2021     Lab Results   Component Value Date    CREATININE 0.96 02/18/2021       Imaging Reviewed during this Office Visit:   (results were independently reviewed by physician and radiology report verified)    PAST MEDICAL, FAMILY AND SOCIAL HISTORY:  Past Medical History:   Diagnosis Date    Back pain     history of    Basal cell cancer 2008    history of, right scapula, right eyelid, mid back,    Benign prostatic hypertrophy     history of    Cancer of lower lip     pre cancer per dermatologist    Carotid bruit     history of    Chondromalacia     External hemorrhoid     history of    Fibrous histiocytoma     history of, left ankle    Hearing deficit     Hypertension     Left hip pain     with arthritis    Numbness and tingling     of arms    Rectal bleeding     history of    Right knee pain     history of    Right shoulder pain     history of    Ulnar neuropathy     history of     Past Surgical History:   Procedure Laterality Date    COLONOSCOPY  2005    normal    COLONOSCOPY  3/2015    hemorrhoids    KNEE SURGERY Right 1981    MOHS SURGERY Right 09/18/2008    wide exc ba cell ca with z plasty right scapula    MOHS SURGERY Right 12/13/2011    right eyelid basal cell cancer    MOHS SURGERY Left 1/28/15    left upper nose basal cell carcinoman Dr Angel Corey Bilateral 1997   Juan Edward Left 01/30/2019 1/30/2019 [address] [facility] [address] Rico Waldrop Right 2009    VASECTOMY       Family History   Problem Relation Age of Onset    Stroke Father     Cancer Father         Stomach    Other Other         carotid artery disease     Outpatient Medications Marked as Taking for the 7/7/21 encounter (Office Visit) with Mile Segura MD   Medication Sig Dispense Refill    lisinopril-hydroCHLOROthiazide (PRINZIDE;ZESTORETIC) 20-25 MG per tablet Take 1 tablet by mouth daily 90 tablet 5    Turmeric 500 MG CAPS Take by mouth      ibuprofen (ADVIL;MOTRIN) 200 MG tablet Take 400 mg by mouth every 6 hours as needed for Pain prn         Patient has no known allergies.   Social History     Tobacco Use   Smoking Status Former Smoker    Packs/day: 0.50    Years: 10.00    Pack years: 5.00    Types: Cigarettes    Start date: 1979   Waunita Favorite Quit date: 1999    Years since quittin.5   Smokeless Tobacco Never Used       Social History     Substance and Sexual Activity   Alcohol Use Yes    Alcohol/week: 0.0 standard drinks    Comment: 2 per week       REVIEW OF SYSTEMS:  Constitutional: negative  Eyes: negative  Respiratory: negative  Cardiovascular: negative  Gastrointestinal: negative  Musculoskeletal: negative  Genitourinary: negative  Skin: negative   Neurological: negative  Hematological/Lymphatic: negative  Psychological: negative    Physical Exam:    This a 57 y.o. male   Vitals:    21 0821   BP: 120/86   Pulse: 57   Resp: 16   SpO2: 97%     Constitutional: Patient in no acute distress   Neuro: alert and oriented to person place and time. Psych: Mood and affect normal.  Head: atraumatic normocephalic  Eyes: EOMi  HEENT: neck supple, trachea midline  Lungs: Respiratory effort normal  Abdomen: Soft, non-tender, non-distended, No CVA  FROMx4, no cyanosis clubbing edema    Assessment and Plan      1. Hypertrophy of prostate with urinary obstruction    2.  Elevated PSA           Plan:      PSA 1 year  Will monitor AUASS for progression of symptoms  Offered flomax and discussed Urolift, but he would like to monitor for now

## 2022-02-17 DIAGNOSIS — Z00.00 WELLNESS EXAMINATION: ICD-10-CM

## 2022-02-17 DIAGNOSIS — E55.9 VITAMIN D DEFICIENCY: ICD-10-CM

## 2022-02-17 DIAGNOSIS — R73.01 IMPAIRED FASTING GLUCOSE: ICD-10-CM

## 2022-02-17 DIAGNOSIS — K64.9 HEMORRHOIDS, UNSPECIFIED HEMORRHOID TYPE: ICD-10-CM

## 2022-02-17 DIAGNOSIS — I10 ESSENTIAL HYPERTENSION: ICD-10-CM

## 2022-02-17 DIAGNOSIS — N40.1 BENIGN NON-NODULAR PROSTATIC HYPERPLASIA WITH LOWER URINARY TRACT SYMPTOMS: ICD-10-CM

## 2022-02-17 DIAGNOSIS — M19.90 ARTHRITIS: ICD-10-CM

## 2022-02-17 DIAGNOSIS — C44.91 MALIGNANT BASAL CELL NEOPLASM OF SKIN: ICD-10-CM

## 2022-02-17 RX ORDER — LISINOPRIL AND HYDROCHLOROTHIAZIDE 25; 20 MG/1; MG/1
1 TABLET ORAL DAILY
Qty: 90 TABLET | Refills: 5 | Status: SHIPPED | OUTPATIENT
Start: 2022-02-17 | End: 2022-03-01 | Stop reason: SDUPTHER

## 2022-02-17 NOTE — TELEPHONE ENCOUNTER
Nolan Moy is requesting a refill on the following medication(s):  Requested Prescriptions     Pending Prescriptions Disp Refills    lisinopril-hydroCHLOROthiazide (PRINZIDE;ZESTORETIC) 20-25 MG per tablet 90 tablet 5     Sig: Take 1 tablet by mouth daily       Last Visit Date (If Applicable):  5/36/3609    Next Visit Date:    2/21/2022

## 2022-03-01 ENCOUNTER — OFFICE VISIT (OUTPATIENT)
Dept: FAMILY MEDICINE CLINIC | Age: 62
End: 2022-03-01
Payer: COMMERCIAL

## 2022-03-01 VITALS
OXYGEN SATURATION: 97 % | WEIGHT: 184 LBS | DIASTOLIC BLOOD PRESSURE: 66 MMHG | BODY MASS INDEX: 28.82 KG/M2 | RESPIRATION RATE: 16 BRPM | HEART RATE: 53 BPM | SYSTOLIC BLOOD PRESSURE: 130 MMHG | TEMPERATURE: 97 F

## 2022-03-01 DIAGNOSIS — Z13.21 ENCOUNTER FOR VITAMIN DEFICIENCY SCREENING: ICD-10-CM

## 2022-03-01 DIAGNOSIS — N40.1 BENIGN NON-NODULAR PROSTATIC HYPERPLASIA WITH LOWER URINARY TRACT SYMPTOMS: ICD-10-CM

## 2022-03-01 DIAGNOSIS — R73.9 HYPERGLYCEMIA: ICD-10-CM

## 2022-03-01 DIAGNOSIS — H61.23 BILATERAL IMPACTED CERUMEN: Primary | ICD-10-CM

## 2022-03-01 DIAGNOSIS — R73.01 IMPAIRED FASTING GLUCOSE: ICD-10-CM

## 2022-03-01 DIAGNOSIS — M19.90 ARTHRITIS: ICD-10-CM

## 2022-03-01 DIAGNOSIS — E55.9 VITAMIN D DEFICIENCY: ICD-10-CM

## 2022-03-01 DIAGNOSIS — I10 ESSENTIAL HYPERTENSION: ICD-10-CM

## 2022-03-01 DIAGNOSIS — K64.9 HEMORRHOIDS, UNSPECIFIED HEMORRHOID TYPE: ICD-10-CM

## 2022-03-01 DIAGNOSIS — C44.91 MALIGNANT BASAL CELL NEOPLASM OF SKIN: ICD-10-CM

## 2022-03-01 DIAGNOSIS — Z00.00 WELLNESS EXAMINATION: ICD-10-CM

## 2022-03-01 PROCEDURE — G8417 CALC BMI ABV UP PARAM F/U: HCPCS | Performed by: FAMILY MEDICINE

## 2022-03-01 PROCEDURE — 69209 REMOVE IMPACTED EAR WAX UNI: CPT | Performed by: FAMILY MEDICINE

## 2022-03-01 PROCEDURE — 3017F COLORECTAL CA SCREEN DOC REV: CPT | Performed by: FAMILY MEDICINE

## 2022-03-01 PROCEDURE — G8482 FLU IMMUNIZE ORDER/ADMIN: HCPCS | Performed by: FAMILY MEDICINE

## 2022-03-01 PROCEDURE — G8427 DOCREV CUR MEDS BY ELIG CLIN: HCPCS | Performed by: FAMILY MEDICINE

## 2022-03-01 PROCEDURE — 99214 OFFICE O/P EST MOD 30 MIN: CPT | Performed by: FAMILY MEDICINE

## 2022-03-01 PROCEDURE — 1036F TOBACCO NON-USER: CPT | Performed by: FAMILY MEDICINE

## 2022-03-01 RX ORDER — LISINOPRIL AND HYDROCHLOROTHIAZIDE 25; 20 MG/1; MG/1
1 TABLET ORAL DAILY
Qty: 90 TABLET | Refills: 0 | Status: SHIPPED | OUTPATIENT
Start: 2022-03-01 | End: 2022-05-24

## 2022-03-01 SDOH — ECONOMIC STABILITY: FOOD INSECURITY: WITHIN THE PAST 12 MONTHS, YOU WORRIED THAT YOUR FOOD WOULD RUN OUT BEFORE YOU GOT MONEY TO BUY MORE.: NEVER TRUE

## 2022-03-01 SDOH — ECONOMIC STABILITY: FOOD INSECURITY: WITHIN THE PAST 12 MONTHS, THE FOOD YOU BOUGHT JUST DIDN'T LAST AND YOU DIDN'T HAVE MONEY TO GET MORE.: NEVER TRUE

## 2022-03-01 ASSESSMENT — PATIENT HEALTH QUESTIONNAIRE - PHQ9
SUM OF ALL RESPONSES TO PHQ QUESTIONS 1-9: 0
SUM OF ALL RESPONSES TO PHQ QUESTIONS 1-9: 0
SUM OF ALL RESPONSES TO PHQ9 QUESTIONS 1 & 2: 0
SUM OF ALL RESPONSES TO PHQ QUESTIONS 1-9: 0
2. FEELING DOWN, DEPRESSED OR HOPELESS: 0
1. LITTLE INTEREST OR PLEASURE IN DOING THINGS: 0
SUM OF ALL RESPONSES TO PHQ QUESTIONS 1-9: 0

## 2022-03-01 ASSESSMENT — ENCOUNTER SYMPTOMS
ABDOMINAL PAIN: 0
VOMITING: 0
NAUSEA: 0
WHEEZING: 0
DIARRHEA: 0
CHEST TIGHTNESS: 0
RECTAL PAIN: 0
SHORTNESS OF BREATH: 0
PHOTOPHOBIA: 0

## 2022-03-01 ASSESSMENT — SOCIAL DETERMINANTS OF HEALTH (SDOH): HOW HARD IS IT FOR YOU TO PAY FOR THE VERY BASICS LIKE FOOD, HOUSING, MEDICAL CARE, AND HEATING?: NOT HARD AT ALL

## 2022-03-01 NOTE — PROGRESS NOTES
Name: Beena Sharpe  DOS: 3/1/2022  MRN: [id number]      Subjective:  Beena Sharpe is a 57 y.o. male being seen for   Chief Complaint   Patient presents with   Nimo Nelson    Medication Refill     patient is here today to establish and medication refills.         Vitals:    03/01/22 1404   BP: 130/66   Pulse: 53   Resp: 16   Temp: 97 °F (36.1 °C)   SpO2: 97%     No Known Allergies  Past Medical History:   Diagnosis Date    Back pain     history of    Basal cell cancer 2008    history of, right scapula, right eyelid, mid back,    Benign prostatic hypertrophy     history of    Cancer of lower lip     pre cancer per dermatologist    Carotid bruit     history of    Chondromalacia     External hemorrhoid     history of    Fibrous histiocytoma     history of, left ankle    Hearing deficit     Hypertension     Left hip pain     with arthritis    Numbness and tingling     of arms    Rectal bleeding     history of    Right knee pain     history of    Right shoulder pain     history of    Ulnar neuropathy     history of     Past Surgical History:   Procedure Laterality Date    COLONOSCOPY  2005    normal    COLONOSCOPY  3/2015    hemorrhoids    KNEE SURGERY Right 1981    MOHS SURGERY Right 09/18/2008    wide exc ba cell ca with z plasty right scapula    MOHS SURGERY Right 12/13/2011    right eyelid basal cell cancer    MOHS SURGERY Left 1/28/15    left upper nose basal cell carcinoman Dr Lozano Second Bilateral 1997    SHOULDER ARTHROPLASTY Left 01/30/2019 1/30/2019 [address] [facility] Dr Jossie Gottlieb Right 2009    VASECTOMY       Social History     Socioeconomic History    Marital status:      Spouse name: Not on file    Number of children: Not on file    Years of education: Not on file    Highest education level: Not on file   Occupational History    Not on file   Tobacco Use    Smoking status: Former Smoker     Packs/day: 0.50     Years: 10.00 Pack years: 5.00     Types: Cigarettes     Start date: 1979     Quit date: 1999     Years since quittin.1    Smokeless tobacco: Never Used   Substance and Sexual Activity    Alcohol use: Yes     Alcohol/week: 0.0 standard drinks     Comment: 2 per week    Drug use: No    Sexual activity: Not on file   Other Topics Concern    Not on file   Social History Narrative    Not on file     Social Determinants of Health     Financial Resource Strain: Low Risk     Difficulty of Paying Living Expenses: Not hard at all   Food Insecurity: No Food Insecurity    Worried About [facility] in the Last Year: Never true    [facility] in the Last Year: Never true   Transportation Needs:     Lack of Transportation (Medical): Not on file    Lack of Transportation (Non-Medical):  Not on file   Physical Activity:     Days of Exercise per Week: Not on file    Minutes of Exercise per Session: Not on file   Stress:     Feeling of Stress : Not on file   Social Connections:     Frequency of Communication with Friends and Family: Not on file    Frequency of Social Gatherings with Friends and Family: Not on file    Attends Taoism Services: Not on file    Active Member of [address] [place] [address] or Organizations: Not on file    Attends Club or Organization Meetings: Not on file    Marital Status: Not on file   Intimate Partner Violence:     Fear of Current or Ex-Partner: Not on file    Emotionally Abused: Not on file    Physically Abused: Not on file    Sexually Abused: Not on file   Housing Stability:     Unable to Pay for Housing in the Last Year: Not on file    Number of [place] in the Last Year: Not on file    Unstable Housing in the Last Year: Not on file       Current Outpatient Medications   Medication Sig Dispense Refill    lisinopril-hydroCHLOROthiazide (PRINZIDE;ZESTORETIC) 20-25 MG per tablet Take 1 tablet by mouth daily 90 tablet 0    ibuprofen (ADVIL;MOTRIN) 200 MG tablet Take 400 mg by mouth every 6 hours as needed for Pain prn       No current facility-administered medications for this visit. Objective:  Patient is here to be established. He is under care of Dr. [address] for BPH he tried flomax but didn't work so he may have procedure as he sees him in July he will decide. Pt under care of Dr. Arsenio Horton dermatologist for his skin cancer in [place]     Review of Systems   Constitutional: Negative for fatigue and unexpected weight change. Eyes: Negative for photophobia and visual disturbance. Respiratory: Negative for chest tightness, shortness of breath and wheezing. Cardiovascular: Negative for chest pain, palpitations and leg swelling. Gastrointestinal: Negative for abdominal pain, diarrhea, nausea, rectal pain and vomiting. Genitourinary: Negative for difficulty urinating, dysuria, hematuria, penile pain and urgency. Musculoskeletal: Negative for arthralgias and myalgias. Skin: Negative for rash and wound. Neurological: Negative for dizziness, tremors, weakness, light-headedness, numbness and headaches. Hematological: Does not bruise/bleed easily. Psychiatric/Behavioral: Negative for agitation, confusion, self-injury, sleep disturbance and suicidal ideas. The patient is not nervous/anxious. Physical Exam  Constitutional:       Appearance: Normal appearance. HENT:      Head: Normocephalic and atraumatic. Right Ear: Ear canal and external ear normal. There is impacted cerumen (still impacted bebeto re irrigate next time). Left Ear: Tympanic membrane, ear canal and external ear normal. There is impacted cerumen (s/p irrigation TMi no erythema). Nose: Nose normal.      Mouth/Throat:      Mouth: Mucous membranes are moist.      Pharynx: Oropharynx is clear. Eyes:      Extraocular Movements: Extraocular movements intact. Conjunctiva/sclera: Conjunctivae normal.      Pupils: Pupils are equal, round, and reactive to light.    Cardiovascular: Rate and Rhythm: Normal rate and regular rhythm. Pulses: Normal pulses. Heart sounds: Normal heart sounds. No murmur heard. Pulmonary:      Effort: Pulmonary effort is normal.      Breath sounds: Normal breath sounds. Abdominal:      General: Abdomen is flat. Bowel sounds are normal. There is no distension. Palpations: Abdomen is soft. There is no mass. Tenderness: There is no abdominal tenderness. There is no guarding. Musculoskeletal:         General: Normal range of motion. Cervical back: Normal range of motion and neck supple. Lymphadenopathy:      Cervical: No cervical adenopathy. Skin:     General: Skin is warm. Capillary Refill: Capillary refill takes less than 2 seconds. Neurological:      General: No focal deficit present. Mental Status: He is alert and oriented to person, place, and time. Psychiatric:         Mood and Affect: Mood normal.         Behavior: Behavior normal.         Thought Content: Thought content normal.          Assessment:   Diagnosis Orders   1. Bilateral impacted cerumen  NE REMOVAL IMPACTED CERUMEN IRRIGATION/LVG UNILAT   2. Wellness examination  CBC with Auto Differential    Comprehensive Metabolic Panel    Lipid Panel    TSH with Reflex    Urinalysis with Reflex to Culture    Vitamin D 25 Hydroxy   3. Essential hypertension  CBC with Auto Differential    Comprehensive Metabolic Panel    Lipid Panel    TSH with Reflex    Urinalysis with Reflex to Culture    lisinopril-hydroCHLOROthiazide (PRINZIDE;ZESTORETIC) 20-25 MG per tablet   4. Encounter for vitamin deficiency screening  Vitamin D 25 Hydroxy   5. Hemorrhoids, unspecified hemorrhoid type     6. Malignant basal cell neoplasm of skin     7. Arthritis     8. Benign non-nodular prostatic hyperplasia with lower urinary tract symptoms     9. Impaired fasting glucose     10. Vitamin D deficiency     11.  Hyperglycemia  Hemoglobin A1C         Plan:  Orders Placed This Encounter Procedures    CBC with Auto Differential     Standing Status:   Future     Standing Expiration Date:   4/1/2022    Comprehensive Metabolic Panel     Standing Status:   Future     Standing Expiration Date:   4/1/2022    Lipid Panel     Standing Status:   Future     Standing Expiration Date:   4/1/2022     Order Specific Question:   Is Patient Fasting?/# of Hours     Answer: Yes    TSH with Reflex     Standing Status:   Future     Standing Expiration Date:   4/1/2022    Urinalysis with Reflex to Culture     Standing Status:   Future     Standing Expiration Date:   4/1/2022     Order Specific Question:   SPECIFY(EX-CATH,MIDSTREAM,CYSTO,ETC)? Answer:   midstream    Vitamin D 25 Hydroxy     Standing Status:   Future     Standing Expiration Date:   4/1/2022    Hemoglobin A1C     Standing Status:   Future     Standing Expiration Date:   4/1/2022    [place] REMOVAL IMPACTED CERUMEN IRRIGATION/LVG UNILAT         Patient Instructions   Nutrition Health Education:    Keep hydrated, walk 30 minutes minimum 3 times weekly as tolerated. Diet should consist of low fat, low sodium and high fiber. Nutritious foods such as fruits (if you're not diabetic), vegetables, lean meats, lean dairy, whole grains such as brown rice, quinoa, and dry beans. Elspeth Pata with small amounts of heart healthy extra virgin olive oil. Be watchful of any extra salt/sugar/seasoning to your food. You should eat no more than 2 grams or 2,000 mg of salt daily. Salt will raise your BP. Avoid regular/diet sodas, caffeine, energy drinks as these are full of artificial ingredients/sweeteners, sugar, salt and chemicals that spike insulin and are harmful to your health. Sugar intake increases metabolic disfunction, type 2 diabetes, insulin resistance, addictive food behavior and obesity. Avoid all processed foods, foods from boxes, cans, microwave meals as these contain high salt, sugar or fat content and not much nutrition.  Get at least 8 hrs of sleep every night and turn off all electronics at least 1 hour before bedtime as these decreases melatonin production and increases wakefulness. If your cholesterol is high, no greasy, fried, fast or fatty foods. Decrease red meat intake. No butter, garcia, lard or creams, no milk as these things clog your arteries and leads to heart attacks and death. If you smoke, smoking increases risk of lung disease, cancers, high BP, heart attack, stroke and death. Take your daily medications as prescribed and inform your family doctor if you are having any side effects or issues taking medications. Elevated Blood Pressure:   No caffeine   No fast foods   Decrease salt in diet (consume nothing in a can, nothing in a box as these things contain high sodium)   No energy drinks   Buy a BP cuff and take blood pressure 3 times a day and write down blood pressure and pulse and bring in to me when you RTO   Lose weight and increase exercise   Start only walking then may advance to brisk walking and lift low poundage free weights    ELEVATED BLOOD SUGAR  NO sugar, decrease fruit intake, No juice, NO veggies with color other than green, NO sauteed onions or anything that caramelizes, you may eat raw onions. NO alcohol, try not to eat diabetic candies as they may cause diarrhea. Minimize your carbohydrate intake. S/P ear irrigation: If within 2-3 days you experience any ear pain or discharge please call me as you may have an ear infection and I will call in medicated ear drops for you. Fasting blood work this week then return to office next week for Wellness exam       Return in about 2 weeks (around 3/15/2022), or Mirian Habermann, for REVIEW LABS.      Makenna Carey, DO

## 2022-03-01 NOTE — PATIENT INSTRUCTIONS
Nutrition Health Education:    Keep hydrated, walk 30 minutes minimum 3 times weekly as tolerated. Diet should consist of low fat, low sodium and high fiber. Nutritious foods such as fruits (if you're not diabetic), vegetables, lean meats, lean dairy, whole grains such as brown rice, quinoa, and dry beans. Charma Joshua with small amounts of heart healthy extra virgin olive oil. Be watchful of any extra salt/sugar/seasoning to your food. You should eat no more than 2 grams or 2,000 mg of salt daily. Salt will raise your BP. Avoid regular/diet sodas, caffeine, energy drinks as these are full of artificial ingredients/sweeteners, sugar, salt and chemicals that spike insulin and are harmful to your health. Sugar intake increases metabolic disfunction, type 2 diabetes, insulin resistance, addictive food behavior and obesity. Avoid all processed foods, foods from boxes, cans, microwave meals as these contain high salt, sugar or fat content and not much nutrition. Get at least 8 hrs of sleep every night and turn off all electronics at least 1 hour before bedtime as these decreases melatonin production and increases wakefulness. If your cholesterol is high, no greasy, fried, fast or fatty foods. Decrease red meat intake. No butter, garcia, lard or creams, no milk as these things clog your arteries and leads to heart attacks and death. If you smoke, smoking increases risk of lung disease, cancers, high BP, heart attack, stroke and death. Take your daily medications as prescribed and inform your family doctor if you are having any side effects or issues taking medications.     Elevated Blood Pressure:   No caffeine   No fast foods   Decrease salt in diet (consume nothing in a can, nothing in a box as these things contain high sodium)   No energy drinks   Buy a BP cuff and take blood pressure 3 times a day and write down blood pressure and pulse and bring in to me when you RTO   Lose weight and increase exercise  [employer] only walking then may advance to brisk walking and lift low poundage free weights    ELEVATED BLOOD SUGAR  NO sugar, decrease fruit intake, No juice, NO veggies with color other than green, NO sauteed onions or anything that caramelizes, you may eat raw onions. NO alcohol, try not to eat diabetic candies as they may cause diarrhea. Minimize your carbohydrate intake. S/P ear irrigation: If within 2-3 days you experience any ear pain or discharge please call me as you may have an ear infection and I will call in medicated ear drops for you.     Fasting blood work this week then return to office next week for Wellness exam

## 2022-03-03 ENCOUNTER — TELEPHONE (OUTPATIENT)
Dept: FAMILY MEDICINE CLINIC | Age: 62
End: 2022-03-03

## 2022-03-03 NOTE — TELEPHONE ENCOUNTER
Pt came back today for an earwax removal. We did try Tuesday to remove wax from the right ear without success. I instructed the patient Tuesday to continue his debrox drops Wednesday and Thursday morning. Pt did do as instructed and when he came in he stated that he was able to get 2 balls of wax out. I then did the cerumen removal using peroxide and warm water. 2 large amounts of wax were removed with some redness around the the inside wall of the ear. Patient stated he is able to hear correctly now and voiced his appreciation.

## 2022-03-09 LAB
ALBUMIN/GLOBULIN RATIO: 1.6 G/DL
ALBUMIN: 4.3 G/DL (ref 3.5–5)
ALP BLD-CCNC: 52 UNITS/L (ref 38–126)
ALT SERPL-CCNC: 14 UNITS/L (ref 4–50)
ANION GAP SERPL CALCULATED.3IONS-SCNC: 8.9 MMOL/L
AST SERPL-CCNC: 21 UNITS/L (ref 17–59)
BACTERIA, URINE: ABNORMAL
BASOPHILS %: 0.79 (ref 0–3)
BASOPHILS ABSOLUTE: 0.04 (ref 0–0.3)
BILIRUB SERPL-MCNC: 0.7 MG/DL (ref 0.2–1.3)
BILIRUBIN URINE: NEGATIVE
BLOOD, URINE: NEGATIVE
BUN BLDV-MCNC: 18 MG/DL (ref 9–20)
CALCIUM SERPL-MCNC: 9.6 MG/DL (ref 8.4–10.2)
CASTS UA: ABNORMAL
CHLORIDE BLD-SCNC: 103 MMOL/L (ref 98–120)
CHOLESTEROL/HDL RATIO: 2.84 RATIO (ref 0–4.5)
CHOLESTEROL: 190 MG/DL (ref 50–200)
CLARITY: CLEAR
CO2: 28 MMOL/L (ref 22–31)
COLOR, URINE: YELLOW
CREAT SERPL-MCNC: 0.9 MG/DL (ref 0.7–1.3)
CRYSTALS, UA: ABNORMAL
EOSINOPHILS %: 1.4 (ref 0–10)
EOSINOPHILS ABSOLUTE: 0.07 (ref 0–1.1)
GFR CALCULATED: > 60
GLOBULIN: 2.7 G/DL
GLUCOSE URINE: NEGATIVE MG/DL
GLUCOSE: 91 MG/DL (ref 75–110)
HBA1C MFR BLD: 5.1 % (ref 4.4–6.4)
HCT VFR BLD CALC: 48.9 % (ref 42–52)
HDLC SERPL-MCNC: 67 MG/DL (ref 36–68)
HEMOGLOBIN: 15.3 (ref 13.8–17.8)
KETONES, URINE: NEGATIVE MG/DL
LDL CHOLESTEROL CALCULATED: 98.6 MG/DL (ref 0–160)
LEUKOCYTE ESTERASE, URINE: NEGATIVE
LYMPHOCYTE %: 33.44 (ref 20–51.1)
LYMPHOCYTES ABSOLUTE: 1.74 (ref 1–5.5)
MCH RBC QN AUTO: 29.4 PG (ref 28.5–32.5)
MCHC RBC AUTO-ENTMCNC: 31.3 G/DL (ref 32–37)
MCV RBC AUTO: 94 FL (ref 80–94)
MONOCYTES %: 8.15 (ref 1.7–9.3)
MONOCYTES ABSOLUTE: 0.42 (ref 0.1–1)
MUCUS, URINE: ABNORMAL
NEUTROPHILS %: 56.21 (ref 42.2–75.2)
NEUTROPHILS ABSOLUTE: 2.93 (ref 2–8.1)
NITRITE, URINE: NEGATIVE
PDW BLD-RTO: 13.3 % (ref 10–15.5)
PH UA: 6 (ref 5–8.5)
PLATELET # BLD: 237.3 THOU/MM3 (ref 130–400)
POTASSIUM SERPL-SCNC: 4.1 MMOL/L (ref 3.6–5)
PROTEIN UA: NEGATIVE MG/DL
RBC URINE: ABNORMAL (ref 0–2)
RBC: 5.2 M/UL (ref 4.7–6.1)
SODIUM BLD-SCNC: 140 MMOL/L (ref 135–145)
SPECIFIC GRAVITY, URINE: 1.02 MG/DL (ref 1–1.03)
SQUAMOUS EPITHELIAL: ABNORMAL
TOTAL PROTEIN, SERUM: 7.1 G/DL (ref 6.3–8.2)
TRICHOMONAS, URINE: ABNORMAL
TRIGL SERPL-MCNC: 122 MG/DL (ref 10–250)
TSH REFLEX FT4: 1.64 MIU/ML (ref 0.49–4.67)
UROBILINOGEN, URINE: 0.2 MG/DL (ref 0.2–1)
VITAMIN D 25-HYDROXY: 53.9 NG/ML (ref 30–100)
VLDLC SERPL CALC-MCNC: 24 MG/DL (ref 0–50)
WBC URINE: ABNORMAL (ref 0–4)
WBC: 5.2 THOU/ML3 (ref 4.8–10.8)
YEAST, URINE: ABNORMAL

## 2022-03-16 ENCOUNTER — OFFICE VISIT (OUTPATIENT)
Dept: FAMILY MEDICINE CLINIC | Age: 62
End: 2022-03-16
Payer: COMMERCIAL

## 2022-03-16 VITALS
HEIGHT: 69 IN | WEIGHT: 190.4 LBS | DIASTOLIC BLOOD PRESSURE: 84 MMHG | SYSTOLIC BLOOD PRESSURE: 152 MMHG | OXYGEN SATURATION: 97 % | BODY MASS INDEX: 28.2 KG/M2 | TEMPERATURE: 98.2 F | RESPIRATION RATE: 16 BRPM | HEART RATE: 53 BPM

## 2022-03-16 DIAGNOSIS — I10 ESSENTIAL HYPERTENSION: ICD-10-CM

## 2022-03-16 DIAGNOSIS — Z00.00 ENCOUNTER FOR WELL ADULT EXAM WITHOUT ABNORMAL FINDINGS: Primary | ICD-10-CM

## 2022-03-16 PROCEDURE — G8482 FLU IMMUNIZE ORDER/ADMIN: HCPCS | Performed by: FAMILY MEDICINE

## 2022-03-16 PROCEDURE — 99396 PREV VISIT EST AGE 40-64: CPT | Performed by: FAMILY MEDICINE

## 2022-03-16 RX ORDER — LISINOPRIL AND HYDROCHLOROTHIAZIDE 12.5; 1 MG/1; MG/1
1 TABLET ORAL
COMMUNITY
Start: 2021-04-15 | End: 2022-07-06

## 2022-03-16 ASSESSMENT — ENCOUNTER SYMPTOMS
VOMITING: 0
PHOTOPHOBIA: 0
SHORTNESS OF BREATH: 0
COUGH: 0
DIARRHEA: 0
CHEST TIGHTNESS: 0
ABDOMINAL PAIN: 0
WHEEZING: 0
CONSTIPATION: 0

## 2022-03-16 NOTE — PATIENT INSTRUCTIONS
Office Visit on 03/01/2022   Component Date Value Ref Range Status    WBC 03/09/2022 5.2  4.8 - 10.8 Thou/mL3 Final    RBC 03/09/2022 5.20  4.70 - 6.10 M/uL Final    Hemoglobin 03/09/2022 15.3  13.8 - 17.8 Final    Hematocrit 03/09/2022 48.9  42.0 - 52.0 % Final    MCV 03/09/2022 94.0  80.0 - 94.0 fl Final    MCH 03/09/2022 29.4  28.5 - 32.5 pg Final    MCHC 03/09/2022 31.3* 32.0 - 37.0 g/dL Final    RDW 03/09/2022 13.3  10.0 - 15.5 % Final    Platelets 32/64/4885 237.3  130 - 400 Thou/mm3 Final    Neutrophils % 03/09/2022 56.21  42.2 - 75.2 Final    Lymphocyte % 03/09/2022 33.44  20 - 51.1 Final    Monocytes % 03/09/2022 8.153  1.7 - 9.3 Final    Eosinophils % 03/09/2022 1.403  0.0 - 10.0 Final    Basophils % 03/09/2022 0.7890  0.0 - 3.0 Final    Neutrophils Absolute 03/09/2022 2.931  2.0 - 8.1 Final    Lymphocytes Absolute 03/09/2022 1.744  1.0 - 5.5 Final    Monocytes Absolute 03/09/2022 0.4250  0.1 - 1.0 Final    Eosinophils Absolute 03/09/2022 0.0731  0.0 - 1.1 Final    Basophils Absolute 03/09/2022 0.0411  0.0 - 0.3 Final    Sodium 03/09/2022 140  135 - 145 mmol/L Final    Potassium 03/09/2022 4.1  3.6 - 5.0 mmol/L Final    Chloride 03/09/2022 103  98 - 120 mmol/L Final    CO2 03/09/2022 28  22 - 31 mmol/L Final    Anion Gap 03/09/2022 8.9  mmol/L Final    BUN 03/09/2022 18  9 - 20 mg/dL Final    CREATININE 03/09/2022 0.9  0.7 - 1.3 mg/dL Final    Gfr Calculated 03/09/2022 > 60.0   Final    Glucose 03/09/2022 91  75 - 110 mg/dL Final    Calcium 03/09/2022 9.6  8.4 - 10.2 mg/dL Final    Total Bilirubin 03/09/2022 0.7  0.2 - 1.3 mg/dL Final    AST 03/09/2022 21  17 - 59 units/L Final    ALT 03/09/2022 14  4 - 50 units/L Final    Total Protein, Serum 03/09/2022 7.1  6.3 - 8.2 g/dL Final    Albumin 03/09/2022 4.3  3.5 - 5.0 g/dL Final    Globulin 03/09/2022 2.7  g/dL Final    Albumin/Globulin Ratio 03/09/2022 1.6  g/dL Final    Alkaline Phosphatase 03/09/2022 52  38 - 126 units/L Final    Triglycerides 03/09/2022 122  10 - 250 mg/dL Final    Cholesterol 03/09/2022 190  50 - 200 mg/dL Final    LDL Calculated 03/09/2022 98.6  0.0 - 160.0 mg/dL Final    VLDL 03/09/2022 24  0 - 50 mg/dL Final    HDL 03/09/2022 67  36 - 68 mg/dL Final    Chol/HDL Ratio 03/09/2022 2.84  0.0 - 4.50 ratio Final    Hemoglobin A1C 03/09/2022 5.1  4.4 - 6.4 % Final    TSH Reflex FT4 03/09/2022 1.64  0.49 - 4.67 mIU/mL Final    Vit D, 25-Hydroxy 03/09/2022 53.9  30 - 100 ng/mL Final    Color, Urine 03/09/2022 Yellow  Yellow Final    Clarity, UA 03/09/2022 Clear  Clear Final    pH, UA 03/09/2022 6.0  5.0 - 8.5 Final    Specific Gravity, Urine 03/09/2022 1.025  1.005 - 1.03 mg/dL Final    Protein, UA 03/09/2022 Negative  Negative mg/dL Final    Glucose, Ur 03/09/2022 Negative  Negative mg/dL Final    Ketones, Urine 03/09/2022 Negative  Negative mg/dL Final    Nitrite, Urine 03/09/2022 Negative  Negative Final    Bilirubin Urine 03/09/2022 Negative  Negative Final    Urobilinogen, Urine 03/09/2022 0.2  0.2 - 1.0 mg/dL Final    Leukocyte Esterase, Urine 03/09/2022 Negative  Negative Final    Blood, Urine 03/09/2022 Negative  Negative Final    Squam Epithel, UA 03/09/2022 0-2   Final    Crystals, UA 03/09/2022 Absent   Final    BACTERIA, URINE 03/09/2022 Trace   Final    Casts UA 03/09/2022 Absent   Final    MUCUS, URINE 03/09/2022 2+*  Final    Trichomonas, Urine 03/09/2022 Absent   Final    RBC, UA 03/09/2022 0-2  0 - 2 Final    WBC, UA 03/09/2022 0-4  0 - 4 Final    Yeast, Urine 03/09/2022 Absent   Final          Well Visit, Men 48 to 72: Care Instructions  Overview     Well visits can help you stay healthy. Your doctor has checked your overall health and may have suggested ways to take good care of yourself. Your doctor also may have recommended tests. At home, you can help prevent illness with healthy eating, regular exercise, and other steps.   Follow-up care is a key part of your treatment and safety. Be sure to make and go to all appointments, and call your doctor if you are having problems. It's also a good idea to know your test results and keep a list of the medicines you take. How can you care for yourself at home? · Get screening tests that you and your doctor decide on. Screening helps find diseases before any symptoms appear. · Eat healthy foods. Choose fruits, vegetables, whole grains, protein, and low-fat dairy foods. Limit fat, especially saturated fat. Reduce salt in your diet. · Limit alcohol. Have no more than 2 drinks a day or 14 drinks a week. · Get at least 30 minutes of exercise on most days of the week. Walking is a good choice. You also may want to do other activities, such as running, swimming, cycling, or playing tennis or team sports. · Reach and stay at a healthy weight. This will lower your risk for many problems, such as obesity, diabetes, heart disease, and high blood pressure. · Do not smoke. Smoking can make health problems worse. If you need help quitting, talk to your doctor about stop-smoking programs and medicines. These can increase your chances of quitting for good. · Care for your mental health. It is easy to get weighed down by worry and stress. Learn strategies to manage stress, like deep breathing and mindfulness, and stay connected with your family and community. If you find you often feel sad or hopeless, talk with your doctor. Treatment can help. · Talk to your doctor about whether you have any risk factors for sexually transmitted infections (STIs). You can help prevent STIs if you wait to have sex with a new partner (or partners) until you've each been tested for STIs. It also helps if you use condoms (male or female condoms) and if you limit your sex partners to one person who only has sex with you. Vaccines are available for some STIs.   · If it's important to you to prevent pregnancy with your partner, talk with your doctor about birth control options that might be best for you. · If you think you may have a problem with alcohol or drug use, talk to your doctor. This includes prescription medicines (such as amphetamines and opioids) and illegal drugs (such as cocaine and methamphetamine). Your doctor can help you figure out what type of treatment is best for you. · Protect your skin from too much sun. When you're outdoors from 10 a.m. to 4 p.m., stay in the shade or cover up with clothing and a hat with a wide brim. Wear sunglasses that block UV rays. Even when it's cloudy, put broad-spectrum sunscreen (SPF 30 or higher) on any exposed skin. · See a dentist one or two times a year for checkups and to have your teeth cleaned. · Wear a seat belt in the car. When should you call for help? Watch closely for changes in your health, and be sure to contact your doctor if you have any problems or symptoms that concern you. Where can you learn more? Go to https://[employer] org and sign in to your [employer] account. Enter I158 in the Cmed box to learn more about \"Well Visit, Men 48 to 72: Care Instructions. \"     If you do not have an account, please click on the \"Sign Up Now\" link. Current as of: October 6, 2021               Content Version: 13.1  © 0120-4550 Healthwise, Incorporated. Care instructions adapted under license by [facility] ([facility]). If you have questions about a medical condition or this instruction, always ask your healthcare professional. [place] [address] any warranty or liability for your use of this information.     Pt is under care of Dr. Krishna Willis who does his PSA's  reviewed blood work with pt    Pt takes his BP at home averages 116-125/66-70-80    1 year for yearly and 4 months BW and follow up

## 2022-03-16 NOTE — PROGRESS NOTES
Well Adult Note  Name: Danielle Ventura Date: 3/16/2022   MRN: [id number] Sex: Male   Age: 57 y.o. Ethnicity: Non- / Non    : 1960 Race: White (non-)      Wm Haddad is here for well adult exam.  History:  [place] feels good today without complaints      Review of Systems   Constitutional: Negative for fatigue and unexpected weight change. Eyes: Negative for photophobia and visual disturbance. Respiratory: Negative for cough, chest tightness, shortness of breath and wheezing. Cardiovascular: Negative for chest pain, palpitations and leg swelling. Gastrointestinal: Negative for abdominal pain, constipation, diarrhea and vomiting. Genitourinary: Negative for difficulty urinating, hematuria, scrotal swelling and testicular pain. Musculoskeletal: Negative for arthralgias and myalgias. Skin: Negative for rash and wound. Neurological: Negative for dizziness, tremors, seizures, syncope, speech difficulty, weakness, light-headedness, numbness and headaches. Hematological: Negative for adenopathy. Does not bruise/bleed easily. Psychiatric/Behavioral: Negative for agitation, behavioral problems, confusion, decreased concentration, self-injury, sleep disturbance and suicidal ideas. The patient is not nervous/anxious and is not hyperactive. No Known Allergies      Prior to Visit Medications    Medication Sig Taking?  Authorizing Provider   lisinopril-hydroCHLOROthiazide (PRINZIDE;ZESTORETIC) 20-25 MG per tablet Take 1 tablet by mouth daily Yes Shadia Diego,    ibuprofen (ADVIL;MOTRIN) 200 MG tablet Take 400 mg by mouth every 6 hours as needed for Pain prn Yes Historical Provider, MD   lisinopril-hydroCHLOROthiazide (PRINZIDE;ZESTORETIC) 10-12.5 MG per tablet 1 tablet  Patient not taking: Reported on 3/16/2022  Historical Provider, MD         Past Medical History:   Diagnosis Date    Back pain     history of    Basal cell cancer     history of, right scapula, right eyelid, mid back,    Benign prostatic hypertrophy     history of    Cancer of lower lip     pre cancer per dermatologist    Carotid bruit     history of    Chondromalacia     External hemorrhoid     history of    Fibrous histiocytoma     history of, left ankle    Hearing deficit     Hypertension     Left hip pain     with arthritis    Numbness and tingling     of arms    Rectal bleeding     history of    Right knee pain     history of    Right shoulder pain     history of    Ulnar neuropathy     history of       Past Surgical History:   Procedure Laterality Date    COLONOSCOPY      normal    COLONOSCOPY  3/2015    hemorrhoids    KNEE SURGERY Right     MOHS SURGERY Right 2008    wide exc ba cell ca with z plasty right scapula    MOHS SURGERY Right 2011    right eyelid basal cell cancer    MOHS SURGERY Left 1/28/15    left upper nose basal cell carcinoman Dr Roxana Vaughn Bilateral 1997    SHOULDER ARTHROPLASTY Left 2019 [address] [facility] [address] Alex Goddard Right     VASECTOMY           Family History   Problem Relation Age of Onset    Stroke Father     Cancer Father         Stomach    Other Other         carotid artery disease       Social History     Tobacco Use    Smoking status: Former Smoker     Packs/day: 0.50     Years: 10.00     Pack years: 5.00     Types: Cigarettes     Start date: 1979     Quit date: 1999     Years since quittin.2    Smokeless tobacco: Never Used   Substance Use Topics    Alcohol use:  Yes     Alcohol/week: 0.0 standard drinks     Comment: 2 per week    Drug use: No       Objective   BP (!) 152/84 (Site: Left Upper Arm, Position: Sitting, Cuff Size: Medium Adult)   Pulse 53   Temp 98.2 °F (36.8 °C) (Temporal)   Resp 16   Ht 5' 8.5\" (1.74 m)   Wt 190 lb 6.4 oz (86.4 kg)   SpO2 97%   BMI 28.53 kg/m²   Wt Readings from Last 3 Encounters:   22 190 lb 6.4 oz (86.4 kg) 03/01/22 184 lb (83.5 kg)   07/07/21 189 lb (85.7 kg)     There were no vitals filed for this visit. Physical Exam  Constitutional:       General: He is not in acute distress. Appearance: Normal appearance. He is not ill-appearing, toxic-appearing or diaphoretic. HENT:      Head: Normocephalic and atraumatic. Right Ear: Tympanic membrane, ear canal and external ear normal. There is no impacted cerumen. Left Ear: Tympanic membrane, ear canal and external ear normal. There is no impacted cerumen. Nose: Nose normal.      Mouth/Throat:      Mouth: Mucous membranes are moist.      Pharynx: Oropharynx is clear. Eyes:      Extraocular Movements: Extraocular movements intact. Conjunctiva/sclera: Conjunctivae normal.      Pupils: Pupils are equal, round, and reactive to light. Cardiovascular:      Rate and Rhythm: Normal rate and regular rhythm. Pulses: Normal pulses. Heart sounds: Normal heart sounds. No murmur heard. Pulmonary:      Effort: Pulmonary effort is normal.      Breath sounds: Normal breath sounds. Abdominal:      General: Abdomen is flat. Bowel sounds are normal. There is no distension. Palpations: Abdomen is soft. There is no mass. Tenderness: There is no abdominal tenderness. There is no guarding. Musculoskeletal:         General: Normal range of motion. Cervical back: Normal range of motion and neck supple. Right lower leg: No edema. Left lower leg: No edema. Lymphadenopathy:      Cervical: No cervical adenopathy. Skin:     General: Skin is warm. Capillary Refill: Capillary refill takes less than 2 seconds. Findings: No lesion or rash. Neurological:      General: No focal deficit present. Mental Status: He is alert and oriented to person, place, and time. Psychiatric:         Mood and Affect: Mood normal.         Behavior: Behavior normal.         Thought Content:  Thought content normal. Assessment   Plan   1. Encounter for well adult exam without abnormal findings  2. Essential hypertension  -     Comprehensive Metabolic Panel; Future         Personalized Preventive Plan   Current Health Maintenance Status  Immunization History   Administered Date(s) Administered    COVID-19, J&J, PF, 0.5 mL 03/05/2021, 11/04/2021    Influenza Virus Vaccine 12/16/2013, 01/19/2016    Influenza, MDCK Quadv, IM, PF (Flucelvax 2 yrs and older) 09/01/2021    Influenza, Loletta Flatten, IM, PF (6 mo and older Fluzone, Flulaval, Fluarix, and 3 yrs and older Afluria) 01/16/2017, 12/19/2017, 02/13/2020, 10/12/2020    Tdap (Boostrix, Adacel) 06/19/2013    Zoster Recombinant (Shingrix) 11/25/2020, 01/28/2021        Health Maintenance   Topic Date Due    Depression Screen  03/01/2023    Potassium monitoring  03/09/2023    Creatinine monitoring  03/09/2023    DTaP/Tdap/Td vaccine (2 - Td or Tdap) 06/19/2023    Colorectal Cancer Screen  03/25/2025    Lipid screen  03/09/2027    Flu vaccine  Completed    Shingles Vaccine  Completed    COVID-19 Vaccine  Completed    Hepatitis C screen  Completed    HIV screen  Completed    Hepatitis A vaccine  Aged Out    Hepatitis B vaccine  Aged Out    Hib vaccine  Aged Out    Meningococcal (ACWY) vaccine  Aged Out    Pneumococcal 0-64 years Vaccine  Aged Out     Recommendations for [facility] Due: see orders and patient instructions/AVS.    Return in about 4 months (around 7/16/2022), or follow up, for REVIEW LABS.

## 2022-05-23 DIAGNOSIS — I10 ESSENTIAL HYPERTENSION: ICD-10-CM

## 2022-05-24 RX ORDER — LISINOPRIL AND HYDROCHLOROTHIAZIDE 25; 20 MG/1; MG/1
TABLET ORAL
Qty: 90 TABLET | Refills: 0 | Status: SHIPPED | OUTPATIENT
Start: 2022-05-24 | End: 2022-07-18 | Stop reason: SDUPTHER

## 2022-05-24 NOTE — TELEPHONE ENCOUNTER
Luis Hightower is requesting a refill on the following medication(s):  Requested Prescriptions     Pending Prescriptions Disp Refills    lisinopril-hydroCHLOROthiazide (PRINZIDE;ZESTORETIC) 20-25 MG per tablet [Pharmacy Med Name: LISINOP/HCTZ TAB 20-25MG] 90 tablet 0     Sig: TAKE 1 TABLET DAILY       Last Visit Date (If Applicable):  1/87/9337    Next Visit Date:    7/18/2022

## 2022-06-27 LAB
ALBUMIN/GLOBULIN RATIO: 1.5 G/DL
ALBUMIN: 4.1 G/DL (ref 3.5–5)
ALP BLD-CCNC: 52 UNITS/L (ref 38–126)
ALT SERPL-CCNC: 18 UNITS/L (ref 4–50)
ANION GAP SERPL CALCULATED.3IONS-SCNC: 1.3 MMOL/L
AST SERPL-CCNC: 23 UNITS/L (ref 17–59)
BILIRUB SERPL-MCNC: 0.9 MG/DL (ref 0.2–1.3)
BUN BLDV-MCNC: 19 MG/DL (ref 9–20)
CALCIUM SERPL-MCNC: 9.1 MG/DL (ref 8.4–10.2)
CHLORIDE BLD-SCNC: 106 MMOL/L (ref 98–120)
CO2: 29 MMOL/L (ref 22–31)
CREAT SERPL-MCNC: 0.9 MG/DL (ref 0.7–1.3)
DIAGNOSTIC PSA: 2.79 NG/ML (ref 0–4)
GFR CALCULATED: > 60
GLOBULIN: 2.8 G/DL
GLUCOSE: 106 MG/DL (ref 75–110)
POTASSIUM SERPL-SCNC: 3.9 MMOL/L (ref 3.6–5)
SODIUM BLD-SCNC: 137 MMOL/L (ref 135–145)
TOTAL PROTEIN, SERUM: 6.8 G/DL (ref 6.3–8.2)

## 2022-06-27 NOTE — PATIENT INSTRUCTIONS
If you need to reach the Urologist or Urology Nurses for any health care questions or concerns please call [phone] and ask to speak with the [facility] Urology Nurse. The phone line is open from 8a-430p Monday thru Friday.

## 2022-07-06 ENCOUNTER — OFFICE VISIT (OUTPATIENT)
Dept: UROLOGY | Age: 62
End: 2022-07-06
Payer: COMMERCIAL

## 2022-07-06 VITALS
WEIGHT: 190 LBS | HEIGHT: 68 IN | BODY MASS INDEX: 28.79 KG/M2 | DIASTOLIC BLOOD PRESSURE: 80 MMHG | HEART RATE: 53 BPM | RESPIRATION RATE: 12 BRPM | SYSTOLIC BLOOD PRESSURE: 138 MMHG | OXYGEN SATURATION: 97 %

## 2022-07-06 DIAGNOSIS — N13.8 HYPERTROPHY OF PROSTATE WITH URINARY OBSTRUCTION: ICD-10-CM

## 2022-07-06 DIAGNOSIS — R97.20 ELEVATED PSA: Primary | ICD-10-CM

## 2022-07-06 DIAGNOSIS — N40.1 HYPERTROPHY OF PROSTATE WITH URINARY OBSTRUCTION: ICD-10-CM

## 2022-07-06 LAB
BACTERIA, URINE: ABNORMAL
BILIRUBIN URINE: NEGATIVE
BLOOD, URINE: NEGATIVE
CASTS UA: ABNORMAL
CLARITY: CLEAR
COLOR, URINE: YELLOW
CRYSTALS, UA: ABNORMAL
GLUCOSE URINE: NEGATIVE MG/DL
KETONES, URINE: NEGATIVE MG/DL
LEUKOCYTE ESTERASE, URINE: NEGATIVE
MUCUS, URINE: ABNORMAL
NITRITE, URINE: NEGATIVE
PH UA: 6.5 (ref 5–8.5)
PROTEIN UA: ABNORMAL MG/DL
RBC URINE: ABNORMAL (ref 0–2)
SPECIFIC GRAVITY, URINE: 1.02 MG/DL (ref 1–1.03)
SQUAMOUS EPITHELIAL: ABNORMAL
TRICHOMONAS, URINE: ABNORMAL
UROBILINOGEN, URINE: 0.2 MG/DL (ref 0.2–1)
WBC URINE: ABNORMAL (ref 0–4)
YEAST, URINE: ABNORMAL

## 2022-07-06 PROCEDURE — G8427 DOCREV CUR MEDS BY ELIG CLIN: HCPCS | Performed by: UROLOGY

## 2022-07-06 PROCEDURE — 99214 OFFICE O/P EST MOD 30 MIN: CPT | Performed by: UROLOGY

## 2022-07-06 PROCEDURE — G8417 CALC BMI ABV UP PARAM F/U: HCPCS | Performed by: UROLOGY

## 2022-07-06 PROCEDURE — 3017F COLORECTAL CA SCREEN DOC REV: CPT | Performed by: UROLOGY

## 2022-07-06 PROCEDURE — 1036F TOBACCO NON-USER: CPT | Performed by: UROLOGY

## 2022-07-06 NOTE — PROGRESS NOTES
Dr. Veena Olson MD MD  [facility] Consultation / New Patient Visit    Patient:  Houston Reynoso  YOB: 1960  Date: 7/6/2022  Consult requested from DO Iman     HISTORY OF PRESENT ILLNESS:   The patient is a 60 y.o. male who presents today for follow-up for the following problem(s): elevated PSA  Overall the problem(s) : are worsening. Associated Symptoms: No dysuria, gross hematuria. Pain Severity:      Today visit:   7/6/22   Greg Heard presents for history of BPH with LUTs and elevated PSA  Tried flomax with minimal improvement, and had retrograde ejaculation, stopped taking. AUASS: 20/3, 7/1. PSA reveiwed: stable at 2.79, 2.77, (2.69)    11/25/20   Greg Heard presents for history of elevated PSA velocity. No PSA obtained today. AUASS: 9/1, worst symptom is weak stream.  He states these symptoms are stable. 5/27/20  HPI: elevated PSA  Labs reviewed  Old records reviewed  Problem: worsening  Duration: 1 year  Pain: 0/10  Imaging reviewed: none  Family History of prostate cancer? none  Recent history of urinary tract infection/prostatitis? none  Recent catheterization? none  Recent acute urinary retention? none  Lower urinary tract symptoms: weak stream, frequency and urgency,  Hematuria: none  Back and Bone pain: none  Previous prostate biopsy? none      Summary of old records:   (Patient's old records, notes and chart reviewed and summarized above.)    Last several PSA's:  Lab Results   Component Value Date    PSA 2.60 02/13/2020    PSA 1.56 05/14/2018    PSA 2.66 02/07/2018       Last total testosterone:  No results found for: TESTOSTERONE    Urinalysis today:  No results found for this visit on 07/06/22.       Last BUN and creatinine:  Lab Results   Component Value Date    BUN 19 06/27/2022     Lab Results   Component Value Date    CREATININE 0.9 06/27/2022       Imaging Reviewed during this Office Visit:   (results were independently reviewed by physician and radiology report verified)    PAST MEDICAL, FAMILY AND SOCIAL HISTORY:  Past Medical History:   Diagnosis Date    Back pain     history of    Basal cell cancer 2008    history of, right scapula, right eyelid, mid back,    Benign prostatic hypertrophy     history of    Cancer of lower lip     pre cancer per dermatologist    Carotid bruit     history of    Chondromalacia     External hemorrhoid     history of    Fibrous histiocytoma     history of, left ankle    Hearing deficit     Hypertension     Left hip pain     with arthritis    Numbness and tingling     of arms    Rectal bleeding     history of    Right knee pain     history of    Right shoulder pain     history of    Ulnar neuropathy     history of     Past Surgical History:   Procedure Laterality Date    COLONOSCOPY  2005    normal    COLONOSCOPY  3/2015    hemorrhoids    KNEE SURGERY Right 1981    MOHS SURGERY Right 09/18/2008    wide exc ba cell ca with z plasty right scapula    MOHS SURGERY Right 12/13/2011    right eyelid basal cell cancer    MOHS SURGERY Left 1/28/15    left upper nose basal cell carcinoman Dr Jazmyne Craig Bilateral 1997   [place] Left 01/30/2019 1/30/2019 [address] [facility] [address] [place] [facility] Right 2009    VASECTOMY       Family History   Problem Relation Age of Onset    Stroke Father     Cancer Father         Stomach    Other Other         carotid artery disease     Outpatient Medications Marked as Taking for the 7/6/22 encounter (Office Visit) with Lana Herrera MD   Medication Sig Dispense Refill    lisinopril-hydroCHLOROthiazide (PRINZIDE;ZESTORETIC) 20-25 MG per tablet TAKE 1 TABLET DAILY 90 tablet 0    ibuprofen (ADVIL;MOTRIN) 200 MG tablet Take 400 mg by mouth every 6 hours as needed for Pain prn         Patient has no known allergies.   Social History     Tobacco Use   Smoking Status Former Smoker    Packs/day: 0.50    Years: 10.00    Pack years: 5.00    Types: Cigarettes    Start date: 1979   [facility] Quit date: 1999    Years since quittin.5   Smokeless Tobacco Never Used       Social History     Substance and Sexual Activity   Alcohol Use Yes    Alcohol/week: 0.0 standard drinks    Comment: 2 per week       REVIEW OF SYSTEMS:  Constitutional: negative  Eyes: negative  Respiratory: negative  Cardiovascular: negative  Gastrointestinal: negative  Musculoskeletal: negative  Genitourinary: negative  Skin: negative   Neurological: negative  Hematological/Lymphatic: negative  Psychological: negative    Physical Exam:    This a 60 y.o. male   Vitals:    22 0846   BP: 138/80   Pulse: 53   Resp: 12   SpO2: 97%     Constitutional: Patient in no acute distress   Neuro: alert and oriented to person place and time. Psych: Mood and affect normal.  Head: atraumatic normocephalic  Eyes: EOMi  HEENT: neck supple, trachea midline  Lungs: Respiratory effort normal  Abdomen: Soft, non-tender, non-distended, No CVA  FROMx4, no cyanosis clubbing edema    Assessment and Plan      1. Elevated PSA    2. Hypertrophy of prostate with urinary obstruction           Plan:      Elevated PSA - stable - repeat 1 year  BPH LUTs - recent progression of symptoms, which are bothersom  - Cysto for work up.  - Offered Rapaflo as he does not tolerate flomax.   Does not want   - discussed Urolift, but he would like to monitor for now

## 2022-07-18 ENCOUNTER — OFFICE VISIT (OUTPATIENT)
Dept: FAMILY MEDICINE CLINIC | Age: 62
End: 2022-07-18
Payer: COMMERCIAL

## 2022-07-18 VITALS
BODY MASS INDEX: 28.31 KG/M2 | OXYGEN SATURATION: 97 % | RESPIRATION RATE: 16 BRPM | WEIGHT: 186.8 LBS | HEIGHT: 68 IN | TEMPERATURE: 97.9 F | SYSTOLIC BLOOD PRESSURE: 132 MMHG | HEART RATE: 56 BPM | DIASTOLIC BLOOD PRESSURE: 84 MMHG

## 2022-07-18 DIAGNOSIS — I10 ESSENTIAL HYPERTENSION: Primary | ICD-10-CM

## 2022-07-18 DIAGNOSIS — N40.1 BENIGN NON-NODULAR PROSTATIC HYPERPLASIA WITH LOWER URINARY TRACT SYMPTOMS: ICD-10-CM

## 2022-07-18 DIAGNOSIS — H61.21 RIGHT EAR IMPACTED CERUMEN: ICD-10-CM

## 2022-07-18 PROCEDURE — 1036F TOBACCO NON-USER: CPT | Performed by: FAMILY MEDICINE

## 2022-07-18 PROCEDURE — G8417 CALC BMI ABV UP PARAM F/U: HCPCS | Performed by: FAMILY MEDICINE

## 2022-07-18 PROCEDURE — 99213 OFFICE O/P EST LOW 20 MIN: CPT | Performed by: FAMILY MEDICINE

## 2022-07-18 PROCEDURE — G8427 DOCREV CUR MEDS BY ELIG CLIN: HCPCS | Performed by: FAMILY MEDICINE

## 2022-07-18 PROCEDURE — 3017F COLORECTAL CA SCREEN DOC REV: CPT | Performed by: FAMILY MEDICINE

## 2022-07-18 PROCEDURE — 69209 REMOVE IMPACTED EAR WAX UNI: CPT | Performed by: FAMILY MEDICINE

## 2022-07-18 RX ORDER — LISINOPRIL AND HYDROCHLOROTHIAZIDE 25; 20 MG/1; MG/1
1 TABLET ORAL DAILY
Qty: 90 TABLET | Refills: 1 | Status: SHIPPED | OUTPATIENT
Start: 2022-07-18 | End: 2022-10-19

## 2022-07-18 ASSESSMENT — ENCOUNTER SYMPTOMS
ABDOMINAL PAIN: 0
WHEEZING: 0
CHEST TIGHTNESS: 0
PHOTOPHOBIA: 0
SHORTNESS OF BREATH: 0
CHOKING: 0
COUGH: 0

## 2022-07-18 NOTE — PATIENT INSTRUCTIONS
exercising  Start only walking then may advance to brisk walking and lift low poundage free weights if you are capable     Reviewed blood work with pt psa,cmp  Pt is under care of urology    Get a new BP machine as he has an old one.  He is getting one today and bring me in your BP readings    Refilled BP medication    S/P ear irrigation: If you develop ear pain or discharge call me as you may have and ear infection and I may call in medicated ear drops    Blood work and wellness physcial in 4 months

## 2022-07-18 NOTE — PROGRESS NOTES
Name: Dusty Portillo  DOS: 2022  MRN: [id number]      Subjective:  Dusty Portillo is a 60 y.o. male being seen for   Chief Complaint   Patient presents with    Hypertension    Follow-up     4 month lab review.  Labs completed on 2022       Vitals:    22 0932   BP:    Pulse: 56   Resp: 16   Temp:    SpO2: 97%     No Known Allergies  Past Medical History:   Diagnosis Date    Back pain     history of    Basal cell cancer     history of, right scapula, right eyelid, mid back,    Benign prostatic hypertrophy     history of    Cancer of lower lip     pre cancer per dermatologist    Carotid bruit     history of    Chondromalacia     External hemorrhoid     history of    Fibrous histiocytoma     history of, left ankle    Hearing deficit     Hypertension     Left hip pain     with arthritis    Numbness and tingling     of arms    Rectal bleeding     history of    Right knee pain     history of    Right shoulder pain     history of    Ulnar neuropathy     history of     Past Surgical History:   Procedure Laterality Date    COLONOSCOPY      normal    COLONOSCOPY  3/2015    hemorrhoids    KNEE SURGERY Right     MOHS SURGERY Right 2008    wide exc ba cell ca with z plasty right scapula    MOHS SURGERY Right 2011    right eyelid basal cell cancer    MOHS SURGERY Left 1/28/15    left upper nose basal cell carcinoman Dr Danny Can Left 2019 [address] [facility] Dr Angy Quinn Right     VASECTOMY       Social History     Socioeconomic History    Marital status:      Spouse name: None    Number of children: None    Years of education: None    Highest education level: None   Tobacco Use    Smoking status: Former     Packs/day: 0.50     Years: 10.00     Pack years: 5.00     Types: Cigarettes     Start date: 1979     Quit date: 1999     Years since quittin.5    Smokeless tobacco: Never Substance and Sexual Activity    Alcohol use: Yes     Alcohol/week: 0.0 standard drinks     Comment: 2 per week    Drug use: No     Social Determinants of Health     Financial Resource Strain: Low Risk     Difficulty of Paying Living Expenses: Not hard at all   Food Insecurity: No Food Insecurity    Worried About Running Out of Food in the Last Year: Never true    Ran Out of Food in the Last Year: Never true       Current Outpatient Medications   Medication Sig Dispense Refill    lisinopril-hydroCHLOROthiazide (PRINZIDE;ZESTORETIC) 20-25 MG per tablet Take 1 tablet by mouth in the morning. 90 tablet 1    ibuprofen (ADVIL;MOTRIN) 200 MG tablet Take 400 mg by mouth every 6 hours as needed for Pain prn       No current facility-administered medications for this visit. Objective:  Pt is here for follow up. Pt feels good today. Pt is under care of Dr. Holger Becerra has appt for a scope 08/10/2022    Review of Systems   Constitutional:  Negative for fatigue and unexpected weight change. Eyes:  Negative for photophobia and visual disturbance. Respiratory:  Negative for cough, choking, chest tightness, shortness of breath and wheezing. Cardiovascular:  Negative for chest pain, palpitations and leg swelling. Gastrointestinal:  Negative for abdominal pain. Genitourinary:  Negative for difficulty urinating and hematuria. Musculoskeletal:  Negative for arthralgias and myalgias. Skin:  Negative for rash and wound. Neurological:  Negative for dizziness, weakness, light-headedness, numbness and headaches. Hematological:  Negative for adenopathy. Does not bruise/bleed easily. Psychiatric/Behavioral:  Negative for agitation, confusion, decreased concentration and suicidal ideas. Physical Exam  Constitutional:       General: He is not in acute distress. Appearance: Normal appearance. He is not ill-appearing, toxic-appearing or diaphoretic. HENT:      Head: Normocephalic and atraumatic.       Right Ear: Tympanic membrane, ear canal and external ear normal. There is impacted cerumen (s/p irrigation cerumen is gone, TMI, no erythema, no bleeding, pt tolerated well). Left Ear: Tympanic membrane, ear canal and external ear normal. There is no impacted cerumen. Nose: Nose normal. No congestion or rhinorrhea. Mouth/Throat:      Mouth: Mucous membranes are moist.      Pharynx: Oropharynx is clear. No oropharyngeal exudate or posterior oropharyngeal erythema. Eyes:      Extraocular Movements: Extraocular movements intact. Conjunctiva/sclera: Conjunctivae normal.      Pupils: Pupils are equal, round, and reactive to light. Cardiovascular:      Rate and Rhythm: Normal rate and regular rhythm. Pulses: Normal pulses. Heart sounds: Normal heart sounds. No murmur heard. Pulmonary:      Effort: Pulmonary effort is normal.      Breath sounds: Normal breath sounds. No wheezing, rhonchi or rales. Abdominal:      General: Abdomen is flat. Bowel sounds are normal. There is no distension. Palpations: Abdomen is soft. There is no mass. Tenderness: There is no abdominal tenderness. There is no right CVA tenderness, left CVA tenderness or guarding. Musculoskeletal:         General: Normal range of motion. Cervical back: Normal range of motion and neck supple. Right lower leg: No edema. Left lower leg: No edema. Lymphadenopathy:      Cervical: No cervical adenopathy. Skin:     General: Skin is warm. Capillary Refill: Capillary refill takes less than 2 seconds. Neurological:      General: No focal deficit present. Mental Status: He is alert and oriented to person, place, and time. Motor: No weakness. Coordination: Coordination normal.      Gait: Gait normal.   Psychiatric:         Mood and Affect: Mood normal.         Behavior: Behavior normal.         Thought Content: Thought content normal.        Assessment:   Diagnosis Orders   1.  Essential hypertension  Comprehensive Metabolic Panel    lisinopril-hydroCHLOROthiazide (PRINZIDE;ZESTORETIC) 20-25 MG per tablet      2. Benign non-nodular prostatic hyperplasia with lower urinary tract symptoms        3. Right ear impacted cerumen  [place] REMOVAL IMPACTED CERUMEN IRRIGATION/LVG UNILAT            Plan:  Orders Placed This Encounter   Procedures    Comprehensive Metabolic Panel     Standing Status:   Future     Standing Expiration Date:   1/18/2023    [place] REMOVAL IMPACTED CERUMEN IRRIGATION/LVG UNILAT           Patient Instructions   Nutrition Health Education:    Keep hydrated, walk 30 minutes minimum 3 times weekly as tolerated. Diet should consist of low fat, low sodium and high fiber. Nutritious foods such as fruits (if you're not diabetic), vegetables, lean meats, lean dairy, whole grains such as brown rice, quinoa, and dry beans. Clarice Kirstie with small amounts of heart healthy extra virgin olive oil. Be watchful of any extra salt/sugar/seasoning to your food. You should eat no more than 2 grams or 2,000 mg of salt daily. Salt will raise your BP. Avoid regular/diet sodas, caffeine, energy drinks as these are full of artificial ingredients/sweeteners, sugar, salt and chemicals that spike insulin and are harmful to your health. Sugar intake increases metabolic disfunction, type 2 diabetes, insulin resistance, addictive food behavior and obesity. Avoid all processed foods, foods from boxes, cans, microwave meals as these contain high salt, sugar or fat content and not much nutrition. Get at least 8 hrs of sleep every night and turn off all electronics at least 1 hour before bedtime as these decreases melatonin production and increases wakefulness. If your cholesterol is high, no greasy, fried, fast or fatty foods. Decrease red meat intake. No butter, garcia, lard or creams, no milk as these things clog your arteries and leads to heart attacks and death.  If you smoke, smoking increases risk of lung disease, cancers, high BP, heart attack, stroke and death. Take your daily medications as prescribed and inform your family doctor if you are having any side effects or issues taking medications. Elevated Blood Pressure:  No caffeine  No fast foods  Decrease salt in diet (consume nothing in a can, nothing in a box as these things contain high sodium)  No energy drinks  Buy a BP cuff and take blood pressure 3 times a day and write down blood pressure and pulse and bring in to me when you RTO  Lose weight and increase exercise if you are capable of exercising  Start only walking then may advance to brisk walking and lift low poundage free weights if you are capable     Reviewed blood work with pt psa,cmp  Pt is under care of urology    Get a new BP machine as he has an old one.  He is getting one today and bring me in your BP readings    Refilled BP medication    S/P ear irrigation: If you develop ear pain or discharge call me as you may have and ear infection and I may call in medicated ear drops    Blood work and wellness physcial in 4 months     Return in about 4 months (around 11/18/2022) for wellness exam.     Jonathan Moore, DO

## 2022-08-15 ENCOUNTER — TELEPHONE (OUTPATIENT)
Dept: UROLOGY | Age: 62
End: 2022-08-15

## 2022-08-15 NOTE — TELEPHONE ENCOUNTER
LM for patient to return phone call to determine if he wants to go ahead and set up for a Urolift or come in and talk to Dr. Norma De Los Santos about options first.

## 2022-08-17 NOTE — TELEPHONE ENCOUNTER
Hammad Dorman [address]         Patient:Salvador Snell           :1960           Surgical/Procedure Planned: Cystoscopy Urolift    Date & Location: 10/19/22       Outpatient   Planned Length of OR: 30 min    Sedation: MAC    This is a request for medical clearance.     Estimated Cardiac Risk for Non-Cardiac Surgery/Procedure     Low______ Moderate______ High______    Medication Instructions - Clarification needed by this date:     -Other medications:    Resume medications:     Lovenox indicated: _____Yes _____NO    Provider: Dr. Poonam Putnam of Provider Giving Orders for Medication holds:    _____________________________________________

## 2022-08-18 NOTE — TELEPHONE ENCOUNTER
Patient called the office, said was unaware of upcoming procedure. He said if he does this he would like to wait until October.

## 2022-08-18 NOTE — TELEPHONE ENCOUNTER
Pt was not aware he was having any procedure. He has been trying to reach Dr. Anil aWde office. He's had no luck. Pt. Was surprised I was calling to schedule his pre-op clearance since they haven't given him any information yet. Stated he may not be in town around the date they have listed. He is calling them back to see what is going on then will reach out to us to schedule his clearance.

## 2022-08-19 NOTE — TELEPHONE ENCOUNTER
Spoke with patient, would prefer to wait until after October 10th and would prefer [address] [place] [address] Entrance. Left message for [place]  to return phone call to schedule.

## 2022-09-22 ENCOUNTER — OFFICE VISIT (OUTPATIENT)
Dept: FAMILY MEDICINE CLINIC | Age: 62
End: 2022-09-22
Payer: COMMERCIAL

## 2022-09-22 VITALS
BODY MASS INDEX: 26.71 KG/M2 | TEMPERATURE: 96.9 F | OXYGEN SATURATION: 98 % | HEART RATE: 49 BPM | RESPIRATION RATE: 16 BRPM | SYSTOLIC BLOOD PRESSURE: 132 MMHG | HEIGHT: 70 IN | WEIGHT: 186.6 LBS | DIASTOLIC BLOOD PRESSURE: 60 MMHG

## 2022-09-22 DIAGNOSIS — I10 ESSENTIAL HYPERTENSION: ICD-10-CM

## 2022-09-22 DIAGNOSIS — Z01.818 PREOPERATIVE CLEARANCE: Primary | ICD-10-CM

## 2022-09-22 DIAGNOSIS — H61.23 BILATERAL IMPACTED CERUMEN: ICD-10-CM

## 2022-09-22 DIAGNOSIS — Z01.818 PRE-OP TESTING: ICD-10-CM

## 2022-09-22 DIAGNOSIS — Z23 NEED FOR INFLUENZA VACCINATION: ICD-10-CM

## 2022-09-22 LAB
ALBUMIN/GLOBULIN RATIO: 1.5 G/DL
ALBUMIN: 4.1 G/DL (ref 3.5–5)
ALP BLD-CCNC: 56 UNITS/L (ref 38–126)
ALT SERPL-CCNC: 16 UNITS/L (ref 4–50)
ANION GAP SERPL CALCULATED.3IONS-SCNC: 4.6 MMOL/L
AST SERPL-CCNC: 18 UNITS/L (ref 17–59)
BASOPHILS %: 0.97 (ref 0–3)
BASOPHILS ABSOLUTE: 0.06 (ref 0–0.3)
BILIRUB SERPL-MCNC: 0.7 MG/DL (ref 0.2–1.3)
BILIRUBIN, POC: NORMAL
BLOOD URINE, POC: NORMAL
BUN BLDV-MCNC: 13 MG/DL (ref 9–20)
CALCIUM SERPL-MCNC: 9.6 MG/DL (ref 8.4–10.2)
CHLORIDE BLD-SCNC: 104 MMOL/L (ref 98–120)
CLARITY, POC: CLEAR
CO2: 31 MMOL/L (ref 22–31)
COLOR, POC: YELLOW
CREAT SERPL-MCNC: 0.9 MG/DL (ref 0.7–1.3)
EOSINOPHILS %: 0.86 (ref 0–10)
EOSINOPHILS ABSOLUTE: 0.05 (ref 0–1.1)
GFR CALCULATED: > 60
GLOBULIN: 2.7 G/DL
GLUCOSE URINE, POC: NORMAL
GLUCOSE: 103 MG/DL (ref 75–110)
HCT VFR BLD CALC: 48.1 % (ref 42–52)
HEMOGLOBIN: 15 (ref 13.8–17.8)
INR BLD: 1 RATIO (ref 0.8–1.2)
KETONES, POC: NORMAL
LEUKOCYTE EST, POC: NORMAL
LYMPHOCYTE %: 19.26 (ref 20–51.1)
LYMPHOCYTES ABSOLUTE: 1.12 (ref 1–5.5)
MCH RBC QN AUTO: 29.3 PG (ref 28.5–32.5)
MCHC RBC AUTO-ENTMCNC: 31.1 G/DL (ref 32–37)
MCV RBC AUTO: 94.4 FL (ref 80–94)
MONOCYTES %: 9.45 (ref 1.7–9.3)
MONOCYTES ABSOLUTE: 0.55 (ref 0.1–1)
NEUTROPHILS %: 69.46 (ref 42.2–75.2)
NEUTROPHILS ABSOLUTE: 4.06 (ref 2–8.1)
NITRITE, POC: NORMAL
PDW BLD-RTO: 12.5 % (ref 10–15.5)
PH, POC: 6.5
PLATELET # BLD: 246.6 THOU/MM3 (ref 130–400)
POTASSIUM SERPL-SCNC: 4.1 MMOL/L (ref 3.6–5)
PROTEIN, POC: NORMAL
PROTHROMBIN TIME: 11.2 SEC (ref 9.3–12.5)
RBC: 5.1 M/UL (ref 4.7–6.1)
SODIUM BLD-SCNC: 140 MMOL/L (ref 135–145)
SPECIFIC GRAVITY, POC: 1.02
TOTAL PROTEIN, SERUM: 6.8 G/DL (ref 6.3–8.2)
UROBILINOGEN, POC: 0.2
WBC: 5.8 THOU/ML3 (ref 4.8–10.8)

## 2022-09-22 PROCEDURE — 3017F COLORECTAL CA SCREEN DOC REV: CPT | Performed by: FAMILY MEDICINE

## 2022-09-22 PROCEDURE — 81002 URINALYSIS NONAUTO W/O SCOPE: CPT | Performed by: FAMILY MEDICINE

## 2022-09-22 PROCEDURE — 69210 REMOVE IMPACTED EAR WAX UNI: CPT | Performed by: FAMILY MEDICINE

## 2022-09-22 PROCEDURE — G8417 CALC BMI ABV UP PARAM F/U: HCPCS | Performed by: FAMILY MEDICINE

## 2022-09-22 PROCEDURE — G8427 DOCREV CUR MEDS BY ELIG CLIN: HCPCS | Performed by: FAMILY MEDICINE

## 2022-09-22 PROCEDURE — 93000 ELECTROCARDIOGRAM COMPLETE: CPT | Performed by: FAMILY MEDICINE

## 2022-09-22 PROCEDURE — 99214 OFFICE O/P EST MOD 30 MIN: CPT | Performed by: FAMILY MEDICINE

## 2022-09-22 PROCEDURE — 1036F TOBACCO NON-USER: CPT | Performed by: FAMILY MEDICINE

## 2022-09-22 PROCEDURE — 90674 CCIIV4 VAC NO PRSV 0.5 ML IM: CPT | Performed by: FAMILY MEDICINE

## 2022-09-22 PROCEDURE — 90471 IMMUNIZATION ADMIN: CPT | Performed by: FAMILY MEDICINE

## 2022-09-22 SDOH — ECONOMIC STABILITY: FOOD INSECURITY: WITHIN THE PAST 12 MONTHS, THE FOOD YOU BOUGHT JUST DIDN'T LAST AND YOU DIDN'T HAVE MONEY TO GET MORE.: NEVER TRUE

## 2022-09-22 SDOH — ECONOMIC STABILITY: FOOD INSECURITY: WITHIN THE PAST 12 MONTHS, YOU WORRIED THAT YOUR FOOD WOULD RUN OUT BEFORE YOU GOT MONEY TO BUY MORE.: NEVER TRUE

## 2022-09-22 ASSESSMENT — SOCIAL DETERMINANTS OF HEALTH (SDOH): HOW HARD IS IT FOR YOU TO PAY FOR THE VERY BASICS LIKE FOOD, HOUSING, MEDICAL CARE, AND HEATING?: NOT HARD AT ALL

## 2022-09-22 ASSESSMENT — ENCOUNTER SYMPTOMS
CHOKING: 0
ABDOMINAL PAIN: 0
CHEST TIGHTNESS: 0
SINUS PAIN: 0
COUGH: 0
BACK PAIN: 0
SORE THROAT: 0
PHOTOPHOBIA: 0
WHEEZING: 0
SINUS PRESSURE: 0
SHORTNESS OF BREATH: 0

## 2022-09-22 ASSESSMENT — PATIENT HEALTH QUESTIONNAIRE - PHQ9
SUM OF ALL RESPONSES TO PHQ9 QUESTIONS 1 & 2: 0
SUM OF ALL RESPONSES TO PHQ QUESTIONS 1-9: 0
2. FEELING DOWN, DEPRESSED OR HOPELESS: 0
SUM OF ALL RESPONSES TO PHQ QUESTIONS 1-9: 0
SUM OF ALL RESPONSES TO PHQ QUESTIONS 1-9: 0
1. LITTLE INTEREST OR PLEASURE IN DOING THINGS: 0
SUM OF ALL RESPONSES TO PHQ QUESTIONS 1-9: 0

## 2022-09-22 NOTE — PATIENT INSTRUCTIONS
Nutrition Health Education:    Keep hydrated, walk 30 minutes minimum 3 times weekly as tolerated. Diet should consist of low fat, low sodium and high fiber. Nutritious foods such as fruits (if you're not diabetic), vegetables, lean meats, lean dairy, whole grains such as brown rice, quinoa, and dry beans. Gabe Slicker with small amounts of heart healthy extra virgin olive oil. Be watchful of any extra salt/sugar/seasoning to your food. You should eat no more than 2 grams or 2,000 mg of salt daily. Salt will raise your BP. Avoid regular/diet sodas, caffeine, energy drinks as these are full of artificial ingredients/sweeteners, sugar, salt and chemicals that spike insulin and are harmful to your health. Sugar intake increases metabolic disfunction, type 2 diabetes, insulin resistance, addictive food behavior and obesity. Avoid all processed foods, foods from boxes, cans, microwave meals as these contain high salt, sugar or fat content and not much nutrition. Get at least 8 hrs of sleep every night and turn off all electronics at least 1 hour before bedtime as these decreases melatonin production and increases wakefulness. If your cholesterol is high, no greasy, fried, fast or fatty foods. Decrease red meat intake. No butter, garcia, lard or creams, no milk as these things clog your arteries and leads to heart attacks and death. If you smoke, smoking increases risk of lung disease, cancers, high BP, heart attack, stroke and death. Take your daily medications as prescribed and inform your family doctor if you are having any side effects or issues taking medications.      Elevated Blood Pressure:  No caffeine  No fast foods  Decrease salt in diet (consume nothing in a can, nothing in a box as these things contain high sodium)  No energy drinks  Buy a BP cuff and take blood pressure 3 times a day and write down blood pressure and pulse and bring in to me when you RTO  Lose weight and increase exercise if you are capable of exercising  Start only walking then may advance to brisk walking and lift low poundage free weights if you are capable     Ekg done in office today  Flu vaccine was given today pt did not have a reaction to it  in the office  Ordered blood work and a CXR for the pre-op clearance    S/P ear irrigation: If you develop ear pain or discharge call me as you may have and ear infection and I may call in medicated ear drops    Fasting blood work and follow up in 4 months

## 2022-09-22 NOTE — PROGRESS NOTES
Determinants of Health     Financial Resource Strain: Low Risk     Difficulty of Paying Living Expenses: Not hard at all   Food Insecurity: No Food Insecurity    Worried About Running Out of Food in the Last Year: Never true    Ran Out of Food in the Last Year: Never true       Current Outpatient Medications   Medication Sig Dispense Refill    lisinopril-hydroCHLOROthiazide (PRINZIDE;ZESTORETIC) 20-25 MG per tablet Take 1 tablet by mouth in the morning. 90 tablet 1    ibuprofen (ADVIL;MOTRIN) 200 MG tablet Take 400 mg by mouth every 6 hours as needed for Pain prn       No current facility-administered medications for this visit. Objective:  Pt is here for medical clearance for a cytoscopy urolift. He is iunder care of Dr. Berta Guy urologist. Pt feels good today without complaints. He was offered the flu vaccine and accepted. Pt states that he walks on the treadmill a fast walk every morning for an hour. Pt also wants me to look in his ears as he build up wax. Review of Systems   Constitutional:  Negative for fatigue and unexpected weight change. HENT:  Negative for congestion, postnasal drip, sinus pressure, sinus pain and sore throat. Eyes:  Negative for photophobia and visual disturbance. Respiratory:  Negative for cough, choking, chest tightness, shortness of breath and wheezing. Cardiovascular:  Negative for chest pain, palpitations and leg swelling. Gastrointestinal:  Negative for abdominal pain. Genitourinary:  Positive for difficulty urinating. Negative for hematuria. Musculoskeletal:  Negative for arthralgias, back pain, gait problem, myalgias and neck pain. Skin:  Negative for rash and wound. Neurological:  Negative for dizziness, tremors, seizures, weakness, light-headedness, numbness and headaches. Hematological:  Negative for adenopathy. Does not bruise/bleed easily.    Psychiatric/Behavioral:  Negative for agitation, confusion, decreased concentration and suicidal ideas.    Physical Exam  Constitutional:       General: He is not in acute distress. Appearance: Normal appearance. He is not ill-appearing, toxic-appearing or diaphoretic. HENT:      Head: Normocephalic and atraumatic. Right Ear: Tympanic membrane and external ear normal. There is impacted cerumen (s/p irrigation cerumen is gone, TMI, no erythema and no bleeding pt tolerated well). Left Ear: Tympanic membrane and external ear normal. There is impacted cerumen (s/p irrigation cerumen is gone, TMI, no erythema and no bleeding pt tolerated well). Nose: Nose normal. No congestion or rhinorrhea. Mouth/Throat:      Mouth: Mucous membranes are moist.      Pharynx: Oropharynx is clear. No oropharyngeal exudate or posterior oropharyngeal erythema. Eyes:      Extraocular Movements: Extraocular movements intact. Conjunctiva/sclera: Conjunctivae normal.      Pupils: Pupils are equal, round, and reactive to light. Cardiovascular:      Rate and Rhythm: Normal rate and regular rhythm. Pulses: Normal pulses. Heart sounds: Normal heart sounds. No murmur heard. Comments: On exam his HR sounded normal  Pulmonary:      Effort: Pulmonary effort is normal.      Breath sounds: Normal breath sounds. No wheezing, rhonchi or rales. Abdominal:      General: Abdomen is flat. Bowel sounds are normal. There is no distension. Palpations: Abdomen is soft. There is no mass. Tenderness: There is no abdominal tenderness. There is no right CVA tenderness, left CVA tenderness or guarding. Musculoskeletal:         General: Normal range of motion. Cervical back: Normal range of motion and neck supple. Right lower leg: No edema. Left lower leg: No edema. Lymphadenopathy:      Cervical: No cervical adenopathy. Skin:     General: Skin is warm. Capillary Refill: Capillary refill takes less than 2 seconds. Findings: No rash.    Neurological:      General: No focal deficit present. Mental Status: He is alert and oriented to person, place, and time. Motor: No weakness. Coordination: Coordination normal.      Gait: Gait normal.   Psychiatric:         Mood and Affect: Mood normal.         Behavior: Behavior normal.         Thought Content: Thought content normal.        Assessment:   Diagnosis Orders   1. Preoperative clearance  EKG 12 Lead      2. Pre-op testing  CBC with Auto Differential    Comprehensive Metabolic Panel    XR CHEST STANDARD (2 VW)    Protime-INR    POCT Urinalysis no Micro    EKG 12 Lead      3. Essential hypertension  Comprehensive Metabolic Panel    XR CHEST STANDARD (2 VW)    EKG 12 Lead    Comprehensive Metabolic Panel      4. Bilateral impacted cerumen  59863 - [place] REMOVE IMPACTED EAR WAX      5. Need for influenza vaccination  Influenza, FLUCELVAX, (age 10 mo+), IM, Preservative Free, 0.5 mL            Plan:  Orders Placed This Encounter   Procedures    XR CHEST STANDARD (2 VW)     Standing Status:   Future     Standing Expiration Date:   10/22/2022     Order Specific Question:   Reason for exam:     Answer:   preop testing    Influenza, FLUCELVAX, (age 10 mo+), IM, Preservative Free, 0.5 mL    CBC with Auto Differential     Standing Status:   Future     Standing Expiration Date:   10/22/2022    Comprehensive Metabolic Panel     Standing Status:   Future     Standing Expiration Date:   10/22/2022    Protime-INR     Standing Status:   Future     Standing Expiration Date:   10/22/2022     Order Specific Question:   Daily Coumadin Dose? Answer:   none    Comprehensive Metabolic Panel     Standing Status:   Future     Standing Expiration Date:   2/3/2023    POCT Urinalysis no Micro    EKG 12 Lead     Order Specific Question:   Reason for Exam?     Answer:   Chest pain    35448 - [place] REMOVE IMPACTED EAR WAX         Patient Instructions   Nutrition Health Education:    Keep hydrated, walk 30 minutes minimum 3 times weekly as tolerated.  Diet should consist of low fat, low sodium and high fiber. Nutritious foods such as fruits (if you're not diabetic), vegetables, lean meats, lean dairy, whole grains such as brown rice, quinoa, and dry beans. Shalonda Panda with small amounts of heart healthy extra virgin olive oil. Be watchful of any extra salt/sugar/seasoning to your food. You should eat no more than 2 grams or 2,000 mg of salt daily. Salt will raise your BP. Avoid regular/diet sodas, caffeine, energy drinks as these are full of artificial ingredients/sweeteners, sugar, salt and chemicals that spike insulin and are harmful to your health. Sugar intake increases metabolic disfunction, type 2 diabetes, insulin resistance, addictive food behavior and obesity. Avoid all processed foods, foods from boxes, cans, microwave meals as these contain high salt, sugar or fat content and not much nutrition. Get at least 8 hrs of sleep every night and turn off all electronics at least 1 hour before bedtime as these decreases melatonin production and increases wakefulness. If your cholesterol is high, no greasy, fried, fast or fatty foods. Decrease red meat intake. No butter, garcia, lard or creams, no milk as these things clog your arteries and leads to heart attacks and death. If you smoke, smoking increases risk of lung disease, cancers, high BP, heart attack, stroke and death. Take your daily medications as prescribed and inform your family doctor if you are having any side effects or issues taking medications.      Elevated Blood Pressure:  No caffeine  No fast foods  Decrease salt in diet (consume nothing in a can, nothing in a box as these things contain high sodium)  No energy drinks  Buy a BP cuff and take blood pressure 3 times a day and write down blood pressure and pulse and bring in to me when you RTO  Lose weight and increase exercise if you are capable of exercising  Start only walking then may advance to brisk walking and lift low poundage free weights if you are capable     Ekg done in office today  Flu vaccine was given today pt did not have a reaction to it  in the office  Ordered blood work and a CXR for the pre-op clearance    S/P ear irrigation: If you develop ear pain or discharge call me as you may have and ear infection and I may call in medicated ear drops    Fasting blood work and follow up in 4 months     Return in about 4 months (around 1/22/2023) for HTN, REVIEW LABS.      Kady Mcdonough, DO

## 2022-09-30 NOTE — TELEPHONE ENCOUNTER
Urolift under MAC scheduled for 10/19/22 at 8:00 AM at [facility]. Medication list, insurance cards, and last OV notes available via Epic. Notified patient of procedure date and instructed to arrive 90 minutes prior to procedure. Instructed to hold aspirin, vitamins, and supplements for one week prior to procedure. Patient instructed to be NPO after midnight and to hold all medications until after procedure. Patient repeats instructions back and voices understanding.

## 2022-10-12 NOTE — PROGRESS NOTES
DAY OF SURGERY/PROCEDURE  GUIDELINES    As a patient at the [facility], you can expect quality medical and nursing care that is centered on your individual needs. It is our goal to make your surgical experience as comfortable and excellent as possible.  ________________________________________________________________________    The following instructions are general guidelines, if any information on this sheet is different from what your doctor has instructed you to do, please follow your doctor's instructions. Please arrive on 10/19 @ 645 am      Enter through entrance C. Check in at registration     Upon arrival you will be taken to the pre-operative area to get ready for surgery, your family will stay in the waiting room and visit with you once you are ready for surgery. Due to special limitations please limit visitation to 1-2 members of your family at a time. When it is time for surgery your family will return to the waiting room. Nothing to eat, drink, smoke, suck or chew after midnight (no water, gum, mints, cigarettes, cigars, pipes, snuff, chewing tobacco, etc.) or your surgery may be canceled. Take a shower or bath on the morning of your surgery/procedure (Hibiclens if directed)    Brush your teeth, but do not swallow any water    IN CASE OF ILLNESS - If you have a cold or flu symptoms (high fever, runny nose, sore throat, cough, etc.) rash, nausea, vomiting, loose stools, and/or recent contact with someone who has a contagious disease (chick pox, measles, etc.) please call your doctor before coming to the surgery center    Take a small sip of water with heart, blood pressure, and/or seizure medication the morning of surgery.      If applicable bring your:  Inhaler (s)  Hearing aid(s)  Eyeglasses and Case (If you wear contacts they have to be removed before surgery, bring case and solution)  CPAP     DO NOT take anticoagulants (blood thinners, aspirin or aspirin-containing products) as instructed by your physician. DO NOT take any diabetic pills or insulin morning of your surgery. Wear loose, comfortable clothing that is easy to put on and take off. They will remain in post-op with the nurse. If you will be returning home the same day as your surgery, you will need to have a responsible adult (25years of age or older) present to drive you home. You will need someone stay with you at home for the first 24 hours following your surgery. This is due to the anesthesia and the medication given to you during surgery and recovery.

## 2022-10-17 ENCOUNTER — ANESTHESIA EVENT (OUTPATIENT)
Dept: OPERATING ROOM | Age: 62
End: 2022-10-17
Payer: COMMERCIAL

## 2022-10-19 ENCOUNTER — ANESTHESIA (OUTPATIENT)
Dept: OPERATING ROOM | Age: 62
End: 2022-10-19
Payer: COMMERCIAL

## 2022-10-19 ENCOUNTER — HOSPITAL ENCOUNTER (OUTPATIENT)
Age: 62
Setting detail: OUTPATIENT SURGERY
Discharge: HOME | End: 2022-10-19
Attending: UROLOGY | Admitting: UROLOGY
Payer: COMMERCIAL

## 2022-10-19 VITALS
HEIGHT: 68 IN | WEIGHT: 191.2 LBS | BODY MASS INDEX: 28.98 KG/M2 | RESPIRATION RATE: 16 BRPM | TEMPERATURE: 97 F | HEART RATE: 54 BPM | OXYGEN SATURATION: 100 % | SYSTOLIC BLOOD PRESSURE: 152 MMHG | DIASTOLIC BLOOD PRESSURE: 77 MMHG

## 2022-10-19 PROCEDURE — 7100000010 HC PHASE II RECOVERY - FIRST 15 MIN: Performed by: UROLOGY

## 2022-10-19 PROCEDURE — 6360000002 HC RX W HCPCS: Performed by: ANESTHESIOLOGY

## 2022-10-19 PROCEDURE — 2580000003 HC RX 258: Performed by: ANESTHESIOLOGY

## 2022-10-19 PROCEDURE — 2709999900 HC NON-CHARGEABLE SUPPLY: Performed by: UROLOGY

## 2022-10-19 PROCEDURE — 6360000002 HC RX W HCPCS: Performed by: NURSE ANESTHETIST, CERTIFIED REGISTERED

## 2022-10-19 PROCEDURE — C1889 IMPLANT/INSERT DEVICE, NOC: HCPCS | Performed by: UROLOGY

## 2022-10-19 PROCEDURE — 3600000002 HC SURGERY LEVEL 2 BASE: Performed by: UROLOGY

## 2022-10-19 PROCEDURE — 7100000011 HC PHASE II RECOVERY - ADDTL 15 MIN: Performed by: UROLOGY

## 2022-10-19 PROCEDURE — 3700000001 HC ADD 15 MINUTES (ANESTHESIA): Performed by: UROLOGY

## 2022-10-19 PROCEDURE — 3600000012 HC SURGERY LEVEL 2 ADDTL 15MIN: Performed by: UROLOGY

## 2022-10-19 PROCEDURE — 2500000003 HC RX 250 WO HCPCS: Performed by: NURSE ANESTHETIST, CERTIFIED REGISTERED

## 2022-10-19 PROCEDURE — 7100000000 HC PACU RECOVERY - FIRST 15 MIN: Performed by: UROLOGY

## 2022-10-19 PROCEDURE — 6370000000 HC RX 637 (ALT 250 FOR IP): Performed by: UROLOGY

## 2022-10-19 PROCEDURE — 6370000000 HC RX 637 (ALT 250 FOR IP): Performed by: ANESTHESIOLOGY

## 2022-10-19 PROCEDURE — 3700000000 HC ANESTHESIA ATTENDED CARE: Performed by: UROLOGY

## 2022-10-19 DEVICE — DEVICE IMPLANT UROLIFT2 FOR TREAT OF URIN OUTFLO: Type: IMPLANTABLE DEVICE | Status: FUNCTIONAL

## 2022-10-19 RX ORDER — MEPERIDINE HYDROCHLORIDE 50 MG/ML
12.5 INJECTION INTRAMUSCULAR; INTRAVENOUS; SUBCUTANEOUS EVERY 5 MIN PRN
Status: DISCONTINUED | OUTPATIENT
Start: 2022-10-19 | End: 2022-10-19 | Stop reason: HOSPADM

## 2022-10-19 RX ORDER — DOXYCYCLINE HYCLATE 100 MG
100 TABLET ORAL 2 TIMES DAILY
Qty: 10 TABLET | Refills: 0 | Status: SHIPPED | OUTPATIENT
Start: 2022-10-19 | End: 2022-10-24

## 2022-10-19 RX ORDER — SODIUM CHLORIDE, SODIUM LACTATE, POTASSIUM CHLORIDE, CALCIUM CHLORIDE 600; 310; 30; 20 MG/100ML; MG/100ML; MG/100ML; MG/100ML
INJECTION, SOLUTION INTRAVENOUS CONTINUOUS
Status: DISCONTINUED | OUTPATIENT
Start: 2022-10-19 | End: 2022-10-19 | Stop reason: HOSPADM

## 2022-10-19 RX ORDER — SODIUM CHLORIDE 0.9 % (FLUSH) 0.9 %
5-40 SYRINGE (ML) INJECTION PRN
Status: DISCONTINUED | OUTPATIENT
Start: 2022-10-19 | End: 2022-10-19 | Stop reason: HOSPADM

## 2022-10-19 RX ORDER — GLYCOPYRROLATE 0.2 MG/ML
0.4 INJECTION INTRAMUSCULAR; INTRAVENOUS ONCE
Status: DISCONTINUED | OUTPATIENT
Start: 2022-10-19 | End: 2022-10-19 | Stop reason: HOSPADM

## 2022-10-19 RX ORDER — SODIUM CHLORIDE 0.9 % (FLUSH) 0.9 %
5-40 SYRINGE (ML) INJECTION EVERY 12 HOURS SCHEDULED
Status: DISCONTINUED | OUTPATIENT
Start: 2022-10-19 | End: 2022-10-19 | Stop reason: HOSPADM

## 2022-10-19 RX ORDER — SODIUM CHLORIDE 9 MG/ML
INJECTION, SOLUTION INTRAVENOUS PRN
Status: DISCONTINUED | OUTPATIENT
Start: 2022-10-19 | End: 2022-10-19 | Stop reason: HOSPADM

## 2022-10-19 RX ORDER — OXYCODONE HYDROCHLORIDE AND ACETAMINOPHEN 5; 325 MG/1; MG/1
TABLET ORAL
Status: DISCONTINUED
Start: 2022-10-19 | End: 2022-10-19 | Stop reason: HOSPADM

## 2022-10-19 RX ORDER — PHENAZOPYRIDINE HYDROCHLORIDE 100 MG/1
100 TABLET, FILM COATED ORAL 3 TIMES DAILY PRN
Qty: 21 TABLET | Refills: 0 | Status: SHIPPED | OUTPATIENT
Start: 2022-10-19 | End: 2022-10-26

## 2022-10-19 RX ORDER — ONDANSETRON 2 MG/ML
INJECTION INTRAMUSCULAR; INTRAVENOUS PRN
Status: DISCONTINUED | OUTPATIENT
Start: 2022-10-19 | End: 2022-10-19 | Stop reason: SDUPTHER

## 2022-10-19 RX ORDER — LIDOCAINE HYDROCHLORIDE 10 MG/ML
INJECTION, SOLUTION INFILTRATION; PERINEURAL PRN
Status: DISCONTINUED | OUTPATIENT
Start: 2022-10-19 | End: 2022-10-19 | Stop reason: SDUPTHER

## 2022-10-19 RX ORDER — PROMETHAZINE HYDROCHLORIDE 25 MG/ML
6.25 INJECTION, SOLUTION INTRAMUSCULAR; INTRAVENOUS EVERY 5 MIN PRN
Status: DISCONTINUED | OUTPATIENT
Start: 2022-10-19 | End: 2022-10-19 | Stop reason: HOSPADM

## 2022-10-19 RX ORDER — ONDANSETRON 2 MG/ML
INJECTION INTRAMUSCULAR; INTRAVENOUS
Status: DISCONTINUED
Start: 2022-10-19 | End: 2022-10-19 | Stop reason: HOSPADM

## 2022-10-19 RX ORDER — MORPHINE SULFATE 2 MG/ML
2 INJECTION, SOLUTION INTRAMUSCULAR; INTRAVENOUS EVERY 5 MIN PRN
Status: DISCONTINUED | OUTPATIENT
Start: 2022-10-19 | End: 2022-10-19 | Stop reason: HOSPADM

## 2022-10-19 RX ORDER — OXYCODONE HYDROCHLORIDE AND ACETAMINOPHEN 5; 325 MG/1; MG/1
2 TABLET ORAL
Status: DISCONTINUED | OUTPATIENT
Start: 2022-10-19 | End: 2022-10-19 | Stop reason: HOSPADM

## 2022-10-19 RX ORDER — MIDAZOLAM HYDROCHLORIDE 1 MG/ML
INJECTION INTRAMUSCULAR; INTRAVENOUS PRN
Status: DISCONTINUED | OUTPATIENT
Start: 2022-10-19 | End: 2022-10-19 | Stop reason: SDUPTHER

## 2022-10-19 RX ORDER — IPRATROPIUM BROMIDE AND ALBUTEROL SULFATE 2.5; .5 MG/3ML; MG/3ML
1 SOLUTION RESPIRATORY (INHALATION)
Status: DISCONTINUED | OUTPATIENT
Start: 2022-10-19 | End: 2022-10-19 | Stop reason: HOSPADM

## 2022-10-19 RX ORDER — OXYCODONE HYDROCHLORIDE AND ACETAMINOPHEN 5; 325 MG/1; MG/1
1 TABLET ORAL
Status: COMPLETED | OUTPATIENT
Start: 2022-10-19 | End: 2022-10-19

## 2022-10-19 RX ORDER — PROPOFOL 10 MG/ML
INJECTION, EMULSION INTRAVENOUS PRN
Status: DISCONTINUED | OUTPATIENT
Start: 2022-10-19 | End: 2022-10-19 | Stop reason: SDUPTHER

## 2022-10-19 RX ORDER — FENTANYL CITRATE 50 UG/ML
INJECTION, SOLUTION INTRAMUSCULAR; INTRAVENOUS PRN
Status: DISCONTINUED | OUTPATIENT
Start: 2022-10-19 | End: 2022-10-19 | Stop reason: SDUPTHER

## 2022-10-19 RX ORDER — KETOROLAC TROMETHAMINE 30 MG/ML
INJECTION, SOLUTION INTRAMUSCULAR; INTRAVENOUS PRN
Status: DISCONTINUED | OUTPATIENT
Start: 2022-10-19 | End: 2022-10-19 | Stop reason: SDUPTHER

## 2022-10-19 RX ORDER — SODIUM CHLORIDE 9 MG/ML
INJECTION, SOLUTION INTRAVENOUS CONTINUOUS
Status: DISCONTINUED | OUTPATIENT
Start: 2022-10-19 | End: 2022-10-19 | Stop reason: HOSPADM

## 2022-10-19 RX ORDER — LABETALOL HYDROCHLORIDE 5 MG/ML
10 INJECTION, SOLUTION INTRAVENOUS
Status: DISCONTINUED | OUTPATIENT
Start: 2022-10-19 | End: 2022-10-19 | Stop reason: HOSPADM

## 2022-10-19 RX ORDER — CEFAZOLIN 2 G/1
INJECTION, POWDER, FOR SOLUTION INTRAMUSCULAR; INTRAVENOUS
Status: DISCONTINUED
Start: 2022-10-19 | End: 2022-10-19 | Stop reason: HOSPADM

## 2022-10-19 RX ORDER — DEXAMETHASONE SODIUM PHOSPHATE 10 MG/ML
INJECTION, SOLUTION INTRAMUSCULAR; INTRAVENOUS PRN
Status: DISCONTINUED | OUTPATIENT
Start: 2022-10-19 | End: 2022-10-19 | Stop reason: SDUPTHER

## 2022-10-19 RX ORDER — MIDAZOLAM HYDROCHLORIDE 2 MG/2ML
2 INJECTION, SOLUTION INTRAMUSCULAR; INTRAVENOUS
Status: DISCONTINUED | OUTPATIENT
Start: 2022-10-19 | End: 2022-10-19 | Stop reason: HOSPADM

## 2022-10-19 RX ORDER — SODIUM CHLORIDE 9 MG/ML
25 INJECTION, SOLUTION INTRAVENOUS PRN
Status: DISCONTINUED | OUTPATIENT
Start: 2022-10-19 | End: 2022-10-19 | Stop reason: HOSPADM

## 2022-10-19 RX ORDER — ONDANSETRON 2 MG/ML
4 INJECTION INTRAMUSCULAR; INTRAVENOUS
Status: COMPLETED | OUTPATIENT
Start: 2022-10-19 | End: 2022-10-19

## 2022-10-19 RX ORDER — CEFAZOLIN SODIUM 2 G/50ML
SOLUTION INTRAVENOUS PRN
Status: DISCONTINUED | OUTPATIENT
Start: 2022-10-19 | End: 2022-10-19 | Stop reason: SDUPTHER

## 2022-10-19 RX ORDER — LIDOCAINE HYDROCHLORIDE 20 MG/ML
JELLY TOPICAL PRN
Status: DISCONTINUED | OUTPATIENT
Start: 2022-10-19 | End: 2022-10-19 | Stop reason: ALTCHOICE

## 2022-10-19 RX ORDER — DIPHENHYDRAMINE HYDROCHLORIDE 50 MG/ML
12.5 INJECTION INTRAMUSCULAR; INTRAVENOUS
Status: DISCONTINUED | OUTPATIENT
Start: 2022-10-19 | End: 2022-10-19 | Stop reason: HOSPADM

## 2022-10-19 RX ADMIN — PROPOFOL 50 MG: 10 INJECTION, EMULSION INTRAVENOUS at 08:24

## 2022-10-19 RX ADMIN — FENTANYL CITRATE 50 MCG: 50 INJECTION, SOLUTION INTRAMUSCULAR; INTRAVENOUS at 08:23

## 2022-10-19 RX ADMIN — ONDANSETRON 4 MG: 2 INJECTION INTRAMUSCULAR; INTRAVENOUS at 09:24

## 2022-10-19 RX ADMIN — PROPOFOL 50 MG: 10 INJECTION, EMULSION INTRAVENOUS at 08:32

## 2022-10-19 RX ADMIN — FENTANYL CITRATE 50 MCG: 50 INJECTION, SOLUTION INTRAMUSCULAR; INTRAVENOUS at 08:26

## 2022-10-19 RX ADMIN — PROPOFOL 50 MG: 10 INJECTION, EMULSION INTRAVENOUS at 08:27

## 2022-10-19 RX ADMIN — CEFAZOLIN SODIUM 2000 MG: 2 SOLUTION INTRAVENOUS at 08:21

## 2022-10-19 RX ADMIN — PROPOFOL 50 MG: 10 INJECTION, EMULSION INTRAVENOUS at 08:30

## 2022-10-19 RX ADMIN — MIDAZOLAM 2 MG: 1 INJECTION INTRAMUSCULAR; INTRAVENOUS at 08:20

## 2022-10-19 RX ADMIN — LIDOCAINE HYDROCHLORIDE 40 MG: 10 INJECTION, SOLUTION INFILTRATION; PERINEURAL at 08:24

## 2022-10-19 RX ADMIN — ONDANSETRON 4 MG: 2 INJECTION INTRAMUSCULAR; INTRAVENOUS at 08:31

## 2022-10-19 RX ADMIN — PROPOFOL 50 MG: 10 INJECTION, EMULSION INTRAVENOUS at 08:39

## 2022-10-19 RX ADMIN — KETOROLAC TROMETHAMINE 30 MG: 30 INJECTION, SOLUTION INTRAMUSCULAR at 08:33

## 2022-10-19 RX ADMIN — OXYCODONE HYDROCHLORIDE AND ACETAMINOPHEN 1 TABLET: 5; 325 TABLET ORAL at 09:24

## 2022-10-19 RX ADMIN — SODIUM CHLORIDE, POTASSIUM CHLORIDE, SODIUM LACTATE AND CALCIUM CHLORIDE: 600; 310; 30; 20 INJECTION, SOLUTION INTRAVENOUS at 07:35

## 2022-10-19 RX ADMIN — DEXAMETHASONE SODIUM PHOSPHATE 4 MG: 10 INJECTION INTRAMUSCULAR; INTRAVENOUS at 08:31

## 2022-10-19 RX ADMIN — PROPOFOL 50 MG: 10 INJECTION, EMULSION INTRAVENOUS at 08:36

## 2022-10-19 RX ADMIN — PROPOFOL 50 MG: 10 INJECTION, EMULSION INTRAVENOUS at 08:34

## 2022-10-19 ASSESSMENT — PAIN DESCRIPTION - DESCRIPTORS: DESCRIPTORS: BURNING;PRESSURE

## 2022-10-19 ASSESSMENT — PAIN DESCRIPTION - PAIN TYPE
TYPE: OTHER (COMMENT)
TYPE: SURGICAL PAIN

## 2022-10-19 ASSESSMENT — PAIN SCALES - GENERAL
PAINLEVEL_OUTOF10: 2
PAINLEVEL_OUTOF10: 5

## 2022-10-19 ASSESSMENT — PAIN DESCRIPTION - LOCATION: LOCATION: PENIS

## 2022-10-19 ASSESSMENT — PAIN - FUNCTIONAL ASSESSMENT: PAIN_FUNCTIONAL_ASSESSMENT: NONE - DENIES PAIN

## 2022-10-19 NOTE — DISCHARGE INSTRUCTIONS
Discharge instructions: Urolift of the prostate:   Catheter placed for control of hematuria, monitor and call after 30 mins to evaluate. If improved we will consider removal of catheter. If hematuria significant, the patient may be discharged with catheter and plans for removal in am.  Contue flomax for 1 month    You may see blood in the urine after the procedure. This should resolve over the next couple days. Please stay hydrated. You may experience frequency/urgency of urination after the procedure. We expect these symptoms to improve over the next couple weeks. If you cant urinate you may need a catheter, call or present to ER for evaluation    Tylenol for pain control  Pt ok to discharge home in good condition  No heavy lifting, >10 lbs for a couple weeks  Pt should avoid strenuous activity for a couple weeks, including mowing   Pt should walk moderately at home  Pt ok to shower   Pt may resume diet as tolerated  Pt should take Rx as directed  No driving while on narcotics  Please call attending physician or hospital  with questions  Call or Present to ED if fever (> 101F), intractable nausea vomiting or pain. Blood thinning medications: hold for 3-5 days till hematuria resolves. Pt should follow up with Dr. Alexandria Contreras, 4-6 weeks, call to confirm appointment    Activity   You have had anesthesia today  Do not drive, operate heavy equipment, consume alcoholic beverages, or make any important decisions  for 24 hours   If you are taking pain medication: Do not drive or consume alcohol. Take your time changing positions today. You may feel light headed or dizzy if you move too quickly. Continue your home medications as ordered by your physician. Diet   You can eat your normal diet when you feel well. You should start off with bland foods like chicken soup, toast, or yogurt. Then advance as tolerated. Drink plenty of fluids (unless your doctor tells you not to).  Your urine should be very lightly colored without a strong odor.

## 2022-10-19 NOTE — H&P
[facility]  Urology H&P Note     Patient:  Jeanna Bhatt  MRN: [id number]  YOB: 1960    ATTENDING: Filemon Chavira MD     CHIEF COMPLAINT:  BPH    HISTORY OF PRESENT ILLNESS:   The patient is a 60 y.o. male who presents with bph    Patient's old records, notes and chart reviewed and summarized above. Past Medical History:    Past Medical History:   Diagnosis Date    Back pain     history of    Basal cell cancer 2008    history of, right scapula, right eyelid, mid back,    Benign prostatic hypertrophy     history of    Cancer of lower lip     pre cancer per dermatologist    Carotid bruit     history of    Chondromalacia     External hemorrhoid     history of    Fibrous histiocytoma     history of, left ankle    Hearing deficit     Hypertension     Left hip pain     with arthritis    Numbness and tingling     of arms    Rectal bleeding     history of    Right knee pain     history of    Right shoulder pain     history of    Ulnar neuropathy     history of       Past Surgical History:    Past Surgical History:   Procedure Laterality Date    COLONOSCOPY  2005    normal    COLONOSCOPY  3/2015    hemorrhoids    KNEE SURGERY Right 1981    MOHS SURGERY Right 09/18/2008    wide exc ba cell ca with z plasty right scapula    MOHS SURGERY Right 12/13/2011    right eyelid basal cell cancer    MOHS SURGERY Left 1/28/15    left upper nose basal cell carcinoman Dr Dilma Fitzpatrick Left 01/30/2019 1/30/2019 [address] [facility] [address] Diane Doshi Right 2009    VASECTOMY         Medications Prior to Admission:   Prior to Admission medications    Medication Sig Start Date End Date Taking? Authorizing Provider   lisinopril-hydroCHLOROthiazide (PRINZIDE;ZESTORETIC) 20-25 MG per tablet Take 1 tablet by mouth in the morning.  7/18/22 10/19/22  Shadia Diego,    ibuprofen (ADVIL;MOTRIN) 200 MG tablet Take 400 mg by mouth every 6 hours as needed for Pain prn    Historical Provider, MD       Allergies:  Patient has no known allergies. Social History:    Social History     Socioeconomic History    Marital status:      Spouse name: Not on file    Number of children: Not on file    Years of education: Not on file    Highest education level: Not on file   Occupational History    Not on file   Tobacco Use    Smoking status: Former     Packs/day: 0.50     Years: 10.00     Pack years: 5.00     Types: Cigarettes     Start date: 1979     Quit date: 1999     Years since quittin.8    Smokeless tobacco: Never   Vaping Use    Vaping Use: Never used   Substance and Sexual Activity    Alcohol use: Yes     Alcohol/week: 0.0 standard drinks     Comment: 2 per week    Drug use: No    Sexual activity: Not on file   Other Topics Concern    Not on file   Social History Narrative    Not on file     Social Determinants of Health     Financial Resource Strain: Low Risk     Difficulty of Paying Living Expenses: Not hard at all   Food Insecurity: No Food Insecurity    Worried About Running Out of Food in the Last Year: Never true    Ran Out of Food in the Last Year: Never true   Transportation Needs: Not on file   Physical Activity: Not on file   Stress: Not on file   Social Connections: Not on file   Intimate Partner Violence: Not on file   Housing Stability: Not on file       Family History:    Family History   Problem Relation Age of Onset    Stroke Father     Cancer Father         Stomach    Other Other         carotid artery disease       REVIEW OF SYSTEMS:  All systems reviewed and negative except for that already noted in the HPI. Physical Exam:      Patient Vitals for the past 24 hrs:   BP Temp Temp src Pulse Resp SpO2 Height Weight   10/19/22 0721 (!) 158/72 97 °F (36.1 °C) Skin 50 12 96 % 5' 8\" (1.727 m) 191 lb 3.2 oz (86.7 kg)     Constitutional: Patient in no acute distress;    Neuro: alert and oriented to person place and time. Psych: Mood and affect normal.  Skin: Normal  Lungs: Respiratory effort normal  Cardiovascular:  Normal peripheral pulses  Abdomen: Soft, non-tender, non-distended with no CVA, flank pain, hepatosplenomegaly or hernia. Kidneys normal.  Bladder non-tender and not distended. Lymphatics: no palpable lymphadenopathy        Assessment and Plan   Impression:    Patient Active Problem List   Diagnosis    Hemorrhoid    Essential hypertension    Malignant basal cell neoplasm of skin    Arthritis    Benign non-nodular prostatic hyperplasia with lower urinary tract symptoms       Plan: urolift  Risks benefits and alternative procedures are explained, informed consent is obtained, and the patient elects to proceed.

## 2022-10-19 NOTE — ANESTHESIA PRE PROCEDURE
Department of Anesthesiology  Preprocedure Note       Name:  Rosa Keith   Age:  60 y.o.  :  1960                                          MRN:  [id number]         Date:  10/19/2022      Surgeon: Lana Rocha):  Olivia Lopez MD    Procedure: Procedure(s):  CYSTOSCOPY UROLIFT    Medications prior to admission:   Prior to Admission medications    Medication Sig Start Date End Date Taking? Authorizing Provider   lisinopril-hydroCHLOROthiazide (PRINZIDE;ZESTORETIC) 20-25 MG per tablet Take 1 tablet by mouth in the morning.  7/18/22 10/19/22  Shadia Diego DO   ibuprofen (ADVIL;MOTRIN) 200 MG tablet Take 400 mg by mouth every 6 hours as needed for Pain prn    Historical Provider, MD       Current medications:    Current Facility-Administered Medications   Medication Dose Route Frequency Provider Last Rate Last Admin    0.9 % sodium chloride infusion   IntraVENous Continuous Desirae Couch MD        lactated ringers infusion   IntraVENous Continuous Desirae Couch  mL/hr at 10/19/22 0735 New Bag at 10/19/22 0735    sodium chloride flush 0.9 % injection 5-40 mL  5-40 mL IntraVENous 2 times per day Desirae Couch MD        sodium chloride flush 0.9 % injection 5-40 mL  5-40 mL IntraVENous PRN Desirae Couch MD        0.9 % sodium chloride infusion   IntraVENous PRN Desirae Couch MD        ceFAZolin (ANCEF) 2 g injection             ceFAZolin (ANCEF) 2,000 mg in sodium chloride 0.9 % 50 mL IVPB (mini-bag)  2,000 mg IntraVENous On Call to 39 Tracy Estrada MD           Allergies:  No Known Allergies    Problem List:    Patient Active Problem List   Diagnosis Code    Hemorrhoid K64.9    Essential hypertension I10    Malignant basal cell neoplasm of skin C44.91    Arthritis M19.90    Benign non-nodular prostatic hyperplasia with lower urinary tract symptoms N40.1       Past Medical History:        Diagnosis Date    Back pain     history of    Basal cell cancer     history of, right scapula, right eyelid, mid back,    Benign prostatic hypertrophy     history of    Cancer of lower lip     pre cancer per dermatologist    Carotid bruit     history of    Chondromalacia     External hemorrhoid     history of    Fibrous histiocytoma     history of, left ankle    Hearing deficit     Hypertension     Left hip pain     with arthritis    Numbness and tingling     of arms    Rectal bleeding     history of    Right knee pain     history of    Right shoulder pain     history of    Ulnar neuropathy     history of       Past Surgical History:        Procedure Laterality Date    COLONOSCOPY      normal    COLONOSCOPY  3/2015    hemorrhoids    KNEE SURGERY Right 1981    MOHS SURGERY Right 2008    wide exc ba cell ca with z plasty right scapula    MOHS SURGERY Right 2011    right eyelid basal cell cancer    MOHS SURGERY Left 1/28/15    left upper nose basal cell carcinoman Dr Dover [place] Left [facility] Dr Papito [place] Right     VASECTOMY         Social History:    Social History     Tobacco Use    Smoking status: Former     Packs/day: 0.50     Years: 10.00     Pack years: 5.00     Types: Cigarettes     Start date: 1979     Quit date: 1999     Years since quittin.8    Smokeless tobacco: Never   Substance Use Topics    Alcohol use:  Yes     Alcohol/week: 0.0 standard drinks     Comment: 2 per week                                Counseling given: Not Answered      Vital Signs (Current):   Vitals:    10/12/22 1619 10/19/22 0721   BP:  (!) 158/72   Pulse:  50   Resp:  12   Temp:  97 °F (36.1 °C)   TempSrc:  Skin   SpO2:  96%   Weight: 185 lb (83.9 kg) 191 lb 3.2 oz (86.7 kg)   Height: 5' 8\" (1.727 m) 5' 8\" (1.727 m)                                              BP Readings from Last 3 Encounters:   10/19/22 (!) 158/72   22 132/60   22 132/84       NPO Status: Time of last liquid consumption: 0530 (sip water)                        Time of last solid consumption: 2200                        Date of last liquid consumption: 10/19/22                        Date of last solid food consumption: 10/18/22    BMI:   Wt Readings from Last 3 Encounters:   10/19/22 191 lb 3.2 oz (86.7 kg)   09/22/22 186 lb 9.6 oz (84.6 kg)   07/18/22 186 lb 12.8 oz (84.7 kg)     Body mass index is 29.07 kg/m². CBC:   Lab Results   Component Value Date/Time    WBC 5.8 09/22/2022 10:25 AM    WBC 4.6 02/18/2021 10:05 AM    RBC 5.10 09/22/2022 10:25 AM    HGB 15.0 09/22/2022 10:25 AM    HCT 48.1 09/22/2022 10:25 AM    MCV 94.4 09/22/2022 10:25 AM    RDW 12.5 09/22/2022 10:25 AM    .6 09/22/2022 10:25 AM       CMP:   Lab Results   Component Value Date/Time     09/22/2022 10:25 AM    K 4.1 09/22/2022 10:25 AM     09/22/2022 10:25 AM    CO2 31 09/22/2022 10:25 AM    BUN 13 09/22/2022 10:25 AM    CREATININE 0.9 09/22/2022 10:25 AM    GFRAA >60 02/18/2021 10:05 AM    LABGLOM > 60.0 09/22/2022 10:25 AM    LABGLOM >60 02/18/2021 10:05 AM    GLUCOSE 103 09/22/2022 10:25 AM    PROT 7.1 02/18/2021 10:05 AM    CALCIUM 9.6 09/22/2022 10:25 AM    BILITOT 0.7 09/22/2022 10:25 AM    ALKPHOS 56 09/22/2022 10:25 AM    AST 18 09/22/2022 10:25 AM    ALT 16 09/22/2022 10:25 AM       POC Tests: No results for input(s): POCGLU, POCNA, POCK, POCCL, POCBUN, POCHEMO, POCHCT in the last 72 hours.     Coags:   Lab Results   Component Value Date/Time    PROTIME 11.2 09/22/2022 10:25 AM    INR 1.0 09/22/2022 10:25 AM    APTT 26.5 01/24/2019 08:58 AM       HCG (If Applicable): No results found for: PREGTESTUR, PREGSERUM, HCG, HCGQUANT     ABGs: No results found for: PHART, PO2ART, LOH5NJZ, FSY2ZVG, BEART, U2DWFFBW     Type & Screen (If Applicable):  No results found for: LABABO, LABRH    Drug/Infectious Status (If Applicable):  Lab Results   Component Value Date/Time    HEPCAB NONREACTIVE 02/07/2018 08:22 AM       COVID-19 Screening (If Applicable):   Lab Results   Component Value Date/Time    COVID19 Not Detected 07/20/2020 11:33 AM           Anesthesia Evaluation  Patient summary reviewed and Nursing notes reviewed  Airway: Mallampati: III  TM distance: >3 FB   Neck ROM: full  Mouth opening: > = 3 FB   Dental: normal exam         Pulmonary:Negative Pulmonary ROS and normal exam                              ROS comment: -QUIT SMOKING 30 YEARS AGO   Cardiovascular:    (+) hypertension:,       ECG reviewed                     ROS comment: -EKG - SB @ 49     Neuro/Psych:                ROS comment: -NUMBNESS TINGLING BILATERAL ARMS GI/Hepatic/Renal: Neg GI/Hepatic/Renal ROS            Endo/Other:    (+) : arthritis:., malignancy/cancer. ROS comment: -SKIN CANCER  -NPO AFTER MIDNIGHT  -NKDA Abdominal:             Vascular: negative vascular ROS. Other Findings:           Anesthesia Plan      MAC     ASA 2     (IF UNABLE TO TOLERATE MAC, THEN PLACE LMA)  Induction: intravenous. MIPS: Postoperative opioids intended and Prophylactic antiemetics administered. Anesthetic plan and risks discussed with patient. Plan discussed with CRNA.     Attending anesthesiologist reviewed and agrees with Marcia Pereira MD   10/19/2022

## 2022-10-19 NOTE — ANESTHESIA POSTPROCEDURE EVALUATION
Department of Anesthesiology  Postprocedure Note    Patient: Vicky Glass  MRN: [id number]  [place]: 1960  Date of evaluation: 10/19/2022      Procedure Summary     Date: 10/19/22 Room / Location: [facility] [address] / [address] [place] [address]    Anesthesia Start: 2942 Anesthesia Stop: 9460    Procedure: CYSTOSCOPY UROLIFT Diagnosis:       Benign prostatic hyperplasia, unspecified whether lower urinary tract symptoms present      (Benign prostatic hyperplasia, unspecified whether lower urinary tract symptoms present [N40.0])    Surgeons: Kasia Blackmon MD Responsible Provider: Jimi Almanzar MD    Anesthesia Type: MAC ASA Status: 2          Anesthesia Type: No value filed.     Francois Phase I: Francois Score: 5    Francois Phase II:        Anesthesia Post Evaluation    Patient location during evaluation: PACU  Patient participation: complete - patient participated  Level of consciousness: awake and alert  Airway patency: patent  Nausea & Vomiting: no nausea and no vomiting  Complications: no  Cardiovascular status: hemodynamically stable  Respiratory status: room air and spontaneous ventilation  Hydration status: euvolemic  Multimodal analgesia pain management approach

## 2022-10-19 NOTE — OP NOTE
FACILITY:  [facility]  [birth year]  [id number]    DATE: 10/19/22   SURGEON: Dr. Kelly Marlow MD , M.D.  Lisette Rajni: none  PREOPERATIVE DIAGNOSIS:  BPH with obstruction   POSTOPERATIVE DIAGNOSIS: same  OPERATION:  1. Cystoscopy Urolift  ANESTHESIA:  MAC.   COMPLICATIONS:  None. ESTIMATED BLOOD LOSS:  Minimal.  FLUIDS:  Crystalloid. DRAINS:  none  Implants: 8 x urolift clips. SPECIMENS:  None. INDICATIONS FOR THE PROCEDURE:  Kev Concepcion is a 60 y.o. male  with a history of BPH with obstruction. After treatment options were discussed including risks, benefits, alternatives, goals and possible complications,  the patient elected to proceed with today's procedure. NARRATIVE SUMMARY OF THE PROCEDURE:  The patient was moved back to the OR and placed in lithotomy position. He was induced under MAC anesthesia after a time out was taken per protocol with everyone in agreement. The patient had a urolift scope passed into the urethra and into the bladder with ease. The prostate was evaluated and noted the bladder neck and veru montanum, which were our landmarks through out the case. We started at the bladder neck and moved 1.5 cm distal to ensure proper placement of the proximal clips bilaterally. We then placed clips in the distal gland, ensuring not to travel distal to the veru. We ensured the anterior channel was widely patent and saw the bulk of the prostate obstruction be resolved. We placed a total of:  8 urolift clips in proper position. There was no capsular pull through. There was no clip migration. We visualized the bladder neck and did not note any rogue clips in the bladder. There were no clips placed distally to the very that would affect the external sphincter. At this time hemostasis was achieved. We filled the bladder and terminated the case. The patient tolerated well and was moved to PACU in good position.     Plan  Urinate in PACU  Follow up as outpatient.

## 2022-10-20 ENCOUNTER — NURSE ONLY (OUTPATIENT)
Dept: UROLOGY | Age: 62
End: 2022-10-20

## 2022-10-20 DIAGNOSIS — N40.1 HYPERTROPHY OF PROSTATE WITH URINARY OBSTRUCTION: Primary | ICD-10-CM

## 2022-10-20 DIAGNOSIS — N13.8 HYPERTROPHY OF PROSTATE WITH URINARY OBSTRUCTION: Primary | ICD-10-CM

## 2022-10-20 NOTE — PROGRESS NOTES
Patient present for leigh removal post Cysto Urolift, yellow color urine noted in drainage bag, 10 ml balloon deflated, and 20 fr was removed without difficulty. Instructed patient to drink fluids, patient may experience urinary leakage, blood in the urine, and it may burn when urinating for a few weeks. If patient is unable to urinate in 8 hours, is to call the office to have leigh placed or needs to go to the emergency room. Patient verbalized understanding. 98.2

## 2022-10-21 ENCOUNTER — TELEPHONE (OUTPATIENT)
Dept: UROLOGY | Age: 62
End: 2022-10-21

## 2022-10-21 NOTE — TELEPHONE ENCOUNTER
Patient given Azo after discharge from [facility] for Peter Frederickmarshall Longo. Having some difficulty with urination, but if he is taking Azo and drinking plenty of water he is fine. Discussed that this can be normal for some patients.

## 2022-10-21 NOTE — TELEPHONE ENCOUNTER
Patients wife called the office with some questions regarding the Urolift patient had with Dr. Carlos Dougherty.  Please give her a call back at [phone]

## 2022-10-24 ENCOUNTER — TELEPHONE (OUTPATIENT)
Dept: UROLOGY | Age: 62
End: 2022-10-24

## 2022-10-24 NOTE — TELEPHONE ENCOUNTER
Patient calling, states he was seen in ER on Friday and leigh catheter was replaced due to retention issues. He is calling this morning for catheter removal, requesting today. Discussed that a phone call would be made to Dr. Genoveva Lockhart would be made for further instructions and we would get back to patient. LM for Dr. Genoveva Lockhart to return phone call.

## 2022-10-24 NOTE — TELEPHONE ENCOUNTER
Patient's wife calling again. Notified that Dr. Chelsey Griffith and nursing staff has been in clinic and will return phone call when contact has been made.

## 2022-10-24 NOTE — TELEPHONE ENCOUNTER
Spoke with Dr. Mohan Jaime, patient to have catheter removed on Tuesday and see him in the office on Wednesday. Patient notified and scheduled.

## 2022-10-25 ENCOUNTER — NURSE ONLY (OUTPATIENT)
Dept: UROLOGY | Age: 62
End: 2022-10-25

## 2022-10-25 DIAGNOSIS — N13.8 HYPERTROPHY OF PROSTATE WITH URINARY OBSTRUCTION: Primary | ICD-10-CM

## 2022-10-25 DIAGNOSIS — N40.1 HYPERTROPHY OF PROSTATE WITH URINARY OBSTRUCTION: Primary | ICD-10-CM

## 2022-10-25 NOTE — PROGRESS NOTES
Patient present for leigh removal post Cystoscopy Urolift, yellow color urine noted in drainage bag, 10 ml balloon deflated, and 20 fr was removed without difficulty. Instructed patient to drink fluids, patient may experience urinary leakage, blood in the urine, and it may burn when urinating for a few weeks. If patient is unable to urinate in 8 hours, is to call the office to have eligh placed or needs to go to the emergency room. Patient verbalized understanding.

## 2022-10-26 ENCOUNTER — OFFICE VISIT (OUTPATIENT)
Dept: UROLOGY | Age: 62
End: 2022-10-26
Payer: COMMERCIAL

## 2022-10-26 VITALS
HEIGHT: 68 IN | HEART RATE: 57 BPM | SYSTOLIC BLOOD PRESSURE: 120 MMHG | WEIGHT: 180 LBS | DIASTOLIC BLOOD PRESSURE: 70 MMHG | BODY MASS INDEX: 27.28 KG/M2

## 2022-10-26 DIAGNOSIS — N13.8 HYPERTROPHY OF PROSTATE WITH URINARY OBSTRUCTION: Primary | ICD-10-CM

## 2022-10-26 DIAGNOSIS — N40.1 HYPERTROPHY OF PROSTATE WITH URINARY OBSTRUCTION: Primary | ICD-10-CM

## 2022-10-26 PROCEDURE — 51798 US URINE CAPACITY MEASURE: CPT | Performed by: UROLOGY

## 2023-01-17 DIAGNOSIS — I10 ESSENTIAL HYPERTENSION: ICD-10-CM

## 2023-01-17 RX ORDER — LISINOPRIL AND HYDROCHLOROTHIAZIDE 25; 20 MG/1; MG/1
1 TABLET ORAL DAILY
Qty: 90 TABLET | Refills: 0 | Status: SHIPPED | OUTPATIENT
Start: 2023-01-17 | End: 2023-04-19

## 2023-01-17 NOTE — TELEPHONE ENCOUNTER
Patient told he can wait to see what Jan Li wants to order at appointment.     ----- Message from Carson Blankenship sent at 1/17/2023 11:14 AM EST -----  Subject: Message to Provider    QUESTIONS  Information for Provider? Patient wants to know if he still needs to get   the lab done that provider [facility] ordered. ---------------------------------------------------------------------------  --------------  Rene ORDAZ  [phone]; OK to leave message on voicemail  ---------------------------------------------------------------------------  --------------  SCRIPT ANSWERS  Relationship to Patient?  Self

## 2023-01-17 NOTE — TELEPHONE ENCOUNTER
Sylvia Aponte is requesting a refill on the following medication(s):  Requested Prescriptions     Pending Prescriptions Disp Refills    lisinopril-hydroCHLOROthiazide (PRINZIDE;ZESTORETIC) 20-25 MG per tablet 90 tablet 0     Sig: Take 1 tablet by mouth daily       Last Visit Date (If Applicable):  Visit date not found    Next Visit Date:    Visit date not found

## 2023-01-23 ENCOUNTER — TELEPHONE (OUTPATIENT)
Dept: FAMILY MEDICINE CLINIC | Age: 63
End: 2023-01-23

## 2023-01-23 ENCOUNTER — OFFICE VISIT (OUTPATIENT)
Dept: FAMILY MEDICINE CLINIC | Age: 63
End: 2023-01-23
Payer: COMMERCIAL

## 2023-01-23 VITALS
HEART RATE: 57 BPM | DIASTOLIC BLOOD PRESSURE: 82 MMHG | BODY MASS INDEX: 29.19 KG/M2 | SYSTOLIC BLOOD PRESSURE: 138 MMHG | WEIGHT: 192 LBS | OXYGEN SATURATION: 97 %

## 2023-01-23 DIAGNOSIS — H60.501 ACUTE OTITIS EXTERNA OF RIGHT EAR, UNSPECIFIED TYPE: Primary | ICD-10-CM

## 2023-01-23 PROCEDURE — 3079F DIAST BP 80-89 MM HG: CPT

## 2023-01-23 PROCEDURE — 99213 OFFICE O/P EST LOW 20 MIN: CPT

## 2023-01-23 PROCEDURE — 3075F SYST BP GE 130 - 139MM HG: CPT

## 2023-01-23 RX ORDER — CIPROFLOXACIN AND DEXAMETHASONE 3; 1 MG/ML; MG/ML
4 SUSPENSION/ DROPS AURICULAR (OTIC) 2 TIMES DAILY
Qty: 1 EACH | Refills: 0 | Status: SHIPPED | OUTPATIENT
Start: 2023-01-23 | End: 2023-01-23

## 2023-01-23 RX ORDER — NEOMYCIN SULFATE, POLYMYXIN B SULFATE AND HYDROCORTISONE 10; 3.5; 1 MG/ML; MG/ML; [USP'U]/ML
3 SUSPENSION/ DROPS AURICULAR (OTIC) 3 TIMES DAILY
Qty: 1 EACH | Refills: 0 | Status: SHIPPED | OUTPATIENT
Start: 2023-01-23 | End: 2023-01-30

## 2023-01-23 ASSESSMENT — ENCOUNTER SYMPTOMS
NAUSEA: 0
EYE DISCHARGE: 0
CONSTIPATION: 0
WHEEZING: 0
CHEST TIGHTNESS: 0
EYE ITCHING: 0
SHORTNESS OF BREATH: 0
SINUS PAIN: 0
BACK PAIN: 0
COUGH: 0
RHINORRHEA: 0
ABDOMINAL PAIN: 0
SINUS PRESSURE: 0
DIARRHEA: 0
COLOR CHANGE: 0

## 2023-01-23 ASSESSMENT — PATIENT HEALTH QUESTIONNAIRE - PHQ9
SUM OF ALL RESPONSES TO PHQ QUESTIONS 1-9: 0
SUM OF ALL RESPONSES TO PHQ QUESTIONS 1-9: 0
2. FEELING DOWN, DEPRESSED OR HOPELESS: 0
SUM OF ALL RESPONSES TO PHQ QUESTIONS 1-9: 0
SUM OF ALL RESPONSES TO PHQ9 QUESTIONS 1 & 2: 0
1. LITTLE INTEREST OR PLEASURE IN DOING THINGS: 0
SUM OF ALL RESPONSES TO PHQ QUESTIONS 1-9: 0

## 2023-01-23 NOTE — TELEPHONE ENCOUNTER
----- Message from [facility] sent at 1/23/2023  1:07 PM EST -----  Subject: Medication Problem    Medication: ciprofloxacin-dexamethasone (CIPRODEX) 0.3-0.1 % otic   suspension  Dosage: .3-.1%  Ordering Provider: undefined    Question/Problem: Pt states that this script was too expensive for him to   fill and is requesting another script by chance. Please return call to pt   when new script has been sent.        Pharmacy: [address] [place] [address] Sarah Bacon [facility].   [phone] Jo Clark [phone]    ---------------------------------------------------------------------------  --------------  Rodney JIANG  [phone]; OK to leave message on voicemail  ---------------------------------------------------------------------------  --------------    SCRIPT ANSWERS  Relationship to Patient: Self

## 2023-01-23 NOTE — PROGRESS NOTES
Delmer Lovell (:  1960) is a 60 y.o. male,Established patient, here for evaluation of the following chief complaint(s):  Established New Doctor (Patient is here today to establish as well as have his ears checked. He states he does have wax build up a lot. )         ASSESSMENT/PLAN:  1. Acute otitis externa of right ear, unspecified type  -     neomycin-polymyxin-hydrocortisone (CORTISPORIN) 3.5-93387-8 otic suspension; Place 3 drops into both ears 3 times daily for 7 days, Disp-1 each, R-0Normal  TMs are normal bilaterally however canal is erythematous and swollen. We will treat with Cortisporin. Due to multiple cerumen impactions I recommend he does not place anything inside of the canal of his ear as it will cause worsening irritation. He will use this medication once completed if he is still having pain and impaction since he had this ongoing for extended periods of time I will send him to ENT for further work-up. Return in about 6 months (around 2023), or if symptoms worsen or fail to improve. Subjective   SUBJECTIVE/OBJECTIVE:  Flaco Palmer is here today with complaint of bilateral ear pain. He states he gets cerumen impactions quite frequently, recently has been having irritation within the right ear. Left ear has been somewhat normal.      Review of Systems   Constitutional:  Negative for chills, fatigue, fever and unexpected weight change. HENT:  Positive for ear pain. Negative for congestion, rhinorrhea, sinus pressure and sinus pain. Eyes:  Negative for discharge, itching and visual disturbance. Respiratory:  Negative for cough, chest tightness, shortness of breath and wheezing. Cardiovascular:  Negative for chest pain, palpitations and leg swelling. Gastrointestinal:  Negative for abdominal pain, constipation, diarrhea and nausea. Endocrine: Negative for cold intolerance, heat intolerance, polydipsia and polyphagia.    Genitourinary:  Negative for dysuria, flank pain and frequency. Musculoskeletal:  Negative for arthralgias, back pain and myalgias. Skin:  Negative for color change. Allergic/Immunologic: Negative for environmental allergies and food allergies. Neurological:  Negative for dizziness, weakness, light-headedness, numbness and headaches. Psychiatric/Behavioral:  Negative for agitation, behavioral problems and confusion. The patient is not nervous/anxious. Objective   Physical Exam  Vitals and nursing note reviewed. Constitutional:       General: He is not in acute distress. Appearance: Normal appearance. He is not ill-appearing. HENT:      Head: Normocephalic and atraumatic. Right Ear: Tympanic membrane normal. Swelling and tenderness present. Left Ear: Tympanic membrane and external ear normal.      Nose: Nose normal. No congestion or rhinorrhea. Mouth/Throat:      Mouth: Mucous membranes are moist.      Pharynx: Oropharynx is clear. No posterior oropharyngeal erythema. Eyes:      Pupils: Pupils are equal, round, and reactive to light. Cardiovascular:      Rate and Rhythm: Normal rate and regular rhythm. Pulses: Normal pulses. Heart sounds: Normal heart sounds. Pulmonary:      Effort: Pulmonary effort is normal.      Breath sounds: Normal breath sounds. No wheezing or rhonchi. Abdominal:      General: Bowel sounds are normal.      Palpations: There is no mass. Tenderness: There is no abdominal tenderness. Musculoskeletal:         General: No swelling or tenderness. Normal range of motion. Cervical back: Normal range of motion. No rigidity or tenderness. Skin:     General: Skin is warm and dry. Capillary Refill: Capillary refill takes less than 2 seconds. Coloration: Skin is not pale. Findings: No erythema. Neurological:      General: No focal deficit present. Mental Status: He is alert and oriented to person, place, and time.    Psychiatric:         Mood and Affect: Mood normal. Behavior: Behavior normal.         Thought Content: Thought content normal.         Judgment: Judgment normal.                An electronic signature was used to authenticate this note.     --SYLVIA Eli - CNP

## 2023-01-23 NOTE — PROGRESS NOTES
Alma Spivey (:  1960) is a 60 y.o. male,Established patient, here for evaluation of the following chief complaint(s):  Established New Doctor (Patient is here today to establish as well as have his ears checked. He states he does have wax build up a lot. )         ASSESSMENT/PLAN:  1. Acute otitis externa of right ear, unspecified type  -     ciprofloxacin-dexamethasone (CIPRODEX) 0.3-0.1 % otic suspension; Place 4 drops into both ears 2 times daily for 10 days, Disp-1 each, R-0Normal      Return if symptoms worsen or fail to improve. Subjective   SUBJECTIVE/OBJECTIVE:  HPI    Review of Systems       Objective   Physical Exam             An electronic signature was used to authenticate this note.     --SYLVIA Marc - CNP

## 2023-01-24 NOTE — PATIENT INSTRUCTIONS
If you need to reach the Urologist or Urology Nurses for any health care questions or concerns please call [phone] and ask to speak with the [facility] Urology Nurse. The phone line is open from 8a-430p Monday thru Friday.

## 2023-01-25 ENCOUNTER — OFFICE VISIT (OUTPATIENT)
Dept: UROLOGY | Age: 63
End: 2023-01-25
Payer: COMMERCIAL

## 2023-01-25 VITALS
DIASTOLIC BLOOD PRESSURE: 60 MMHG | SYSTOLIC BLOOD PRESSURE: 116 MMHG | WEIGHT: 192 LBS | HEART RATE: 62 BPM | OXYGEN SATURATION: 97 % | BODY MASS INDEX: 29.1 KG/M2 | RESPIRATION RATE: 12 BRPM | HEIGHT: 68 IN

## 2023-01-25 DIAGNOSIS — N40.1 HYPERTROPHY OF PROSTATE WITH URINARY OBSTRUCTION: Primary | ICD-10-CM

## 2023-01-25 DIAGNOSIS — R97.20 ELEVATED PSA: ICD-10-CM

## 2023-01-25 DIAGNOSIS — N13.8 HYPERTROPHY OF PROSTATE WITH URINARY OBSTRUCTION: Primary | ICD-10-CM

## 2023-01-25 DIAGNOSIS — N52.01 ERECTILE DYSFUNCTION DUE TO ARTERIAL INSUFFICIENCY: ICD-10-CM

## 2023-01-25 PROCEDURE — 3074F SYST BP LT 130 MM HG: CPT | Performed by: UROLOGY

## 2023-01-25 PROCEDURE — 99214 OFFICE O/P EST MOD 30 MIN: CPT | Performed by: UROLOGY

## 2023-01-25 PROCEDURE — 3078F DIAST BP <80 MM HG: CPT | Performed by: UROLOGY

## 2023-01-25 RX ORDER — TADALAFIL 20 MG/1
20 TABLET ORAL PRN
Qty: 30 TABLET | Refills: 11 | Status: SHIPPED | OUTPATIENT
Start: 2023-01-25

## 2023-01-25 NOTE — PROGRESS NOTES
[facility] Consultation / New Patient Visit    Patient:  Franco Peter  YOB: 1960  Date: 1/25/2023  Consult requested from SYLVIA Buckley CNP     HISTORY OF PRESENT ILLNESS:   The patient is a 60 y.o. male who presents today for follow-up for the following problem(s): elevated PSA  Overall the problem(s) : are worsening. Associated Symptoms: No dysuria, gross hematuria. Pain Severity:      Today visit:   1/25/23   Gloria Heller presents for history of BPH with LUTs and elevated PSA  - LUTs, s/p Urolift - much improved - AUASS: 1/0 (Urolift), 20/3. PSA reveiwed: stable at 2.79, 2.77, (2.69)    11/25/20   Gloria Heller presents for history of elevated PSA velocity. No PSA obtained today. AUASS: 9/1, worst symptom is weak stream.  He states these symptoms are stable. Summary of old records:   (Patient's old records, notes and chart reviewed and summarized above.)    Last several PSA's:  Lab Results   Component Value Date    PSA 2.60 02/13/2020    PSA 1.56 05/14/2018    PSA 2.66 02/07/2018       Last total testosterone:  No results found for: TESTOSTERONE    Urinalysis today:  No results found for this visit on 01/25/23.       Last BUN and creatinine:  Lab Results   Component Value Date    BUN 13 09/22/2022     Lab Results   Component Value Date    CREATININE 0.9 09/22/2022       Imaging Reviewed during this Office Visit:   (results were independently reviewed by physician and radiology report verified)    PAST MEDICAL, FAMILY AND SOCIAL HISTORY:  Past Medical History:   Diagnosis Date    Back pain     history of    Basal cell cancer 2008    history of, right scapula, right eyelid, mid back,    Benign prostatic hypertrophy     history of    Cancer of lower lip     pre cancer per dermatologist    Carotid bruit     history of    Chondromalacia     External hemorrhoid     history of    Fibrous histiocytoma     history of, left ankle    Hearing deficit Hypertension     Left hip pain     with arthritis    Numbness and tingling     of arms    Rectal bleeding     history of    Right knee pain     history of    Right shoulder pain     history of    Ulnar neuropathy     history of     Past Surgical History:   Procedure Laterality Date    COLONOSCOPY      normal    COLONOSCOPY  3/2015    hemorrhoids    CYSTOSCOPY N/A 10/19/2022    CYSTOSCOPY UROLIFT performed by Dario Wheatley MD at [address] Right 2008    wide exc ba cell ca with z plasty right scapula    MOHS SURGERY Right 2011    right eyelid basal cell cancer    MOHS SURGERY Left 1/28/15    left upper nose basal cell carcinoman Dr Lizzette Pedraza Left 2019 [address] [facility] [address] Zelda Leija Right     VASECTOMY       Family History   Problem Relation Age of Onset    Stroke Father     Cancer Father         Stomach    Other Other         carotid artery disease     Outpatient Medications Marked as Taking for the 23 encounter (Office Visit) with Dario Wheatley MD   Medication Sig Dispense Refill    neomycin-polymyxin-hydrocortisone (CORTISPORIN) 3.5-62172-7 otic suspension Place 3 drops into both ears 3 times daily for 7 days 1 each 0    lisinopril-hydroCHLOROthiazide (PRINZIDE;ZESTORETIC) 20-25 MG per tablet Take 1 tablet by mouth daily 90 tablet 0    ibuprofen (ADVIL;MOTRIN) 200 MG tablet Take 400 mg by mouth every 6 hours as needed for Pain prn         Patient has no known allergies.   Social History     Tobacco Use   Smoking Status Former    Packs/day: 0.50    Years: 10.00    Pack years: 5.00    Types: Cigarettes    Start date: 1979    Quit date: 1999    Years since quittin.0   Smokeless Tobacco Never       Social History     Substance and Sexual Activity   Alcohol Use Yes    Alcohol/week: 0.0 standard drinks    Comment: 2 per week REVIEW OF SYSTEMS:  Constitutional: negative  Eyes: negative  Respiratory: negative  Cardiovascular: negative  Gastrointestinal: negative  Musculoskeletal: negative  Genitourinary: negative  Skin: negative   Neurological: negative  Hematological/Lymphatic: negative  Psychological: negative    Physical Exam:    This a 60 y.o. male   Vitals:    01/25/23 0855   BP: 116/60   Pulse: 62   Resp: 12   SpO2: 97%     Constitutional: Patient in no acute distress   Neuro: alert and oriented to person place and time. Psych: Mood and affect normal.  Head: atraumatic normocephalic  Eyes: EOMi  HEENT: neck supple, trachea midline  Lungs: Respiratory effort normal  Abdomen: Soft, non-tender, non-distended, No CVA  FROMx4, no cyanosis clubbing edema    Assessment and Plan      1. Hypertrophy of prostate with urinary obstruction    2. Elevated PSA    3.  Erectile dysfunction due to arterial insufficiency         Plan:      Elevated PSA - stable - repeat 1 year  BPH LUTs - controlled and stable after Urolift  ED - will trial cialis

## 2023-04-03 DIAGNOSIS — I10 ESSENTIAL HYPERTENSION: ICD-10-CM

## 2023-04-03 RX ORDER — LISINOPRIL AND HYDROCHLOROTHIAZIDE 25; 20 MG/1; MG/1
1 TABLET ORAL DAILY
Qty: 90 TABLET | Refills: 3 | Status: SHIPPED | OUTPATIENT
Start: 2023-04-03 | End: 2023-07-04

## 2023-04-03 NOTE — TELEPHONE ENCOUNTER
Vivi Sneed is requesting a refill on the following medication(s):  Requested Prescriptions     Pending Prescriptions Disp Refills    lisinopril-hydroCHLOROthiazide (PRINZIDE;ZESTORETIC) 20-25 MG per tablet 90 tablet 3     Sig: Take 1 tablet by mouth daily       Last Visit Date (If Applicable):  0/28/6254    Next Visit Date:    7/24/2023

## 2023-07-19 LAB — DIAGNOSTIC PSA: 2.95 NG/ML (ref 0–4)

## 2023-07-24 ENCOUNTER — OFFICE VISIT (OUTPATIENT)
Dept: FAMILY MEDICINE CLINIC | Age: 63
End: 2023-07-24
Payer: COMMERCIAL

## 2023-07-24 VITALS
WEIGHT: 196 LBS | DIASTOLIC BLOOD PRESSURE: 76 MMHG | HEART RATE: 52 BPM | OXYGEN SATURATION: 98 % | BODY MASS INDEX: 29.8 KG/M2 | SYSTOLIC BLOOD PRESSURE: 140 MMHG

## 2023-07-24 DIAGNOSIS — S60.428A BLISTER (NONTHERMAL) OF OTHER FINGER, INITIAL ENCOUNTER: ICD-10-CM

## 2023-07-24 DIAGNOSIS — H61.23 BILATERAL IMPACTED CERUMEN: Primary | ICD-10-CM

## 2023-07-24 PROCEDURE — 3075F SYST BP GE 130 - 139MM HG: CPT

## 2023-07-24 PROCEDURE — 3078F DIAST BP <80 MM HG: CPT

## 2023-07-24 PROCEDURE — 99213 OFFICE O/P EST LOW 20 MIN: CPT

## 2023-07-24 PROCEDURE — 69209 REMOVE IMPACTED EAR WAX UNI: CPT

## 2023-07-24 SDOH — ECONOMIC STABILITY: HOUSING INSECURITY
IN THE LAST 12 MONTHS, WAS THERE A TIME WHEN YOU DID NOT HAVE A STEADY PLACE TO SLEEP OR SLEPT IN A SHELTER (INCLUDING NOW)?: NO

## 2023-07-24 SDOH — ECONOMIC STABILITY: INCOME INSECURITY: HOW HARD IS IT FOR YOU TO PAY FOR THE VERY BASICS LIKE FOOD, HOUSING, MEDICAL CARE, AND HEATING?: NOT HARD AT ALL

## 2023-07-24 SDOH — ECONOMIC STABILITY: FOOD INSECURITY: WITHIN THE PAST 12 MONTHS, YOU WORRIED THAT YOUR FOOD WOULD RUN OUT BEFORE YOU GOT MONEY TO BUY MORE.: NEVER TRUE

## 2023-07-24 SDOH — ECONOMIC STABILITY: FOOD INSECURITY: WITHIN THE PAST 12 MONTHS, THE FOOD YOU BOUGHT JUST DIDN'T LAST AND YOU DIDN'T HAVE MONEY TO GET MORE.: NEVER TRUE

## 2023-07-24 NOTE — PROGRESS NOTES
Shade Berman (:  1960) is a 58 y.o. male,Established patient, here for evaluation of the following chief complaint(s):  Ear Fullness and Blister (He does have a large blister on his right hand near the thumb. )         ASSESSMENT/PLAN:  1. Bilateral impacted cerumen  -     [id number] - CO REMOVAL IMPACTED CERUMEN IRRIGATION/LVG UNILAT  -Post flush TMs are both normal bilaterally. Left ear had more cerumen than right. States fullness in his ears has essentially resolved. 2. Blister (nonthermal) of other finger, initial encounter  -Friction blister noted to right hand. He states he was using a paint brush for multiple hours and then developed at the next day. Discussion of blister care, not poking this or taking the skin off. If this happens then to treat as an open wound cleaning it several times a day with soap and water. He is requesting to have this drained. I am agreeable diseases discussion of risks of infection bleeding is discussed. He verbalized understanding and denies any questions. 29-gauge needle is used after area is cleansed with alcohol. Approximately 5 mL of serous sang fluid is removed and blister is flattened. Still and intact over wound site. Wound care when blister skin falls off or if it stays intact as discussed. If area is painful he may use Tylenol 1 g every 6 hours as needed for pain, Motrin 600 mg every 6-8 hours as needed for pain. No follow-ups on file. Subjective   SUBJECTIVE/OBJECTIVE:  Aaron Hinson is here today with complaint of fullness in bilateral ears. Longstanding history of impacted cerumen off and on. He does not use Q-tips, denies otalgia, denies fever, denies chills. Feelings of fullness waxes and wanes and is most the time her current. Ongoing for the last several weeks. Has not used any over-the-counter drops for this. He also has a large blister on his right hand. Mildly painful, not erythemic however blisters large fluid-filled.   Denies any

## 2023-07-25 NOTE — PATIENT INSTRUCTIONS
If you need to reach the Urologist or Urology Nurses for any health care questions or concerns please call [phone] and ask to speak with the [facility] Urology Nurse. The phone line is open from 8a-430p Monday thru Friday.

## 2023-07-26 ENCOUNTER — OFFICE VISIT (OUTPATIENT)
Dept: UROLOGY | Age: 63
End: 2023-07-26
Payer: COMMERCIAL

## 2023-07-26 VITALS
HEART RATE: 47 BPM | SYSTOLIC BLOOD PRESSURE: 130 MMHG | HEIGHT: 68 IN | WEIGHT: 196 LBS | DIASTOLIC BLOOD PRESSURE: 72 MMHG | OXYGEN SATURATION: 97 % | BODY MASS INDEX: 29.7 KG/M2 | RESPIRATION RATE: 16 BRPM

## 2023-07-26 DIAGNOSIS — N40.1 HYPERTROPHY OF PROSTATE WITH URINARY OBSTRUCTION: Primary | ICD-10-CM

## 2023-07-26 DIAGNOSIS — R97.20 ELEVATED PSA: ICD-10-CM

## 2023-07-26 DIAGNOSIS — N13.8 HYPERTROPHY OF PROSTATE WITH URINARY OBSTRUCTION: Primary | ICD-10-CM

## 2023-07-26 DIAGNOSIS — N52.01 ERECTILE DYSFUNCTION DUE TO ARTERIAL INSUFFICIENCY: ICD-10-CM

## 2023-07-26 PROCEDURE — 3075F SYST BP GE 130 - 139MM HG: CPT | Performed by: UROLOGY

## 2023-07-26 PROCEDURE — 99214 OFFICE O/P EST MOD 30 MIN: CPT | Performed by: UROLOGY

## 2023-07-26 PROCEDURE — 3078F DIAST BP <80 MM HG: CPT | Performed by: UROLOGY

## 2023-07-26 ASSESSMENT — ENCOUNTER SYMPTOMS
SHORTNESS OF BREATH: 0
EYE DISCHARGE: 0
CHEST TIGHTNESS: 0
ABDOMINAL PAIN: 0
EYE ITCHING: 0
WHEEZING: 0
BACK PAIN: 0
RHINORRHEA: 0
SINUS PRESSURE: 0
COUGH: 0
SINUS PAIN: 0
NAUSEA: 0
CONSTIPATION: 0
COLOR CHANGE: 0
DIARRHEA: 0

## 2023-07-26 NOTE — PROGRESS NOTES
normocephalic  Eyes: EOMi  HEENT: neck supple, trachea midline  Lungs: Respiratory effort normal  Abdomen: Soft, non-tender, non-distended, No CVA  FROMx4, no cyanosis clubbing edema    Assessment and Plan      1. Hypertrophy of prostate with urinary obstruction    2. Elevated PSA    3.  Erectile dysfunction due to arterial insufficiency           Plan:      Elevated PSA - stable - repeat 1 year  BPH LUTs - controlled and stable after Urolift  ED - Continue with cialis

## 2024-04-11 ENCOUNTER — OFFICE VISIT (OUTPATIENT)
Dept: FAMILY MEDICINE CLINIC | Age: 64
End: 2024-04-11
Payer: COMMERCIAL

## 2024-04-11 VITALS
HEART RATE: 76 BPM | HEIGHT: 68 IN | DIASTOLIC BLOOD PRESSURE: 80 MMHG | BODY MASS INDEX: 28.79 KG/M2 | OXYGEN SATURATION: 97 % | WEIGHT: 190 LBS | SYSTOLIC BLOOD PRESSURE: 122 MMHG

## 2024-04-11 DIAGNOSIS — Z13.6 ENCOUNTER FOR LIPID SCREENING FOR CARDIOVASCULAR DISEASE: ICD-10-CM

## 2024-04-11 DIAGNOSIS — Z00.00 ENCOUNTER FOR WELL ADULT EXAM WITHOUT ABNORMAL FINDINGS: ICD-10-CM

## 2024-04-11 DIAGNOSIS — Z71.89 ACP (ADVANCE CARE PLANNING): ICD-10-CM

## 2024-04-11 DIAGNOSIS — I10 ESSENTIAL HYPERTENSION: ICD-10-CM

## 2024-04-11 DIAGNOSIS — Z13.220 ENCOUNTER FOR LIPID SCREENING FOR CARDIOVASCULAR DISEASE: ICD-10-CM

## 2024-04-11 DIAGNOSIS — Z23 NEED FOR TDAP VACCINATION: Primary | ICD-10-CM

## 2024-04-11 PROCEDURE — 90715 TDAP VACCINE 7 YRS/> IM: CPT

## 2024-04-11 PROCEDURE — 3079F DIAST BP 80-89 MM HG: CPT

## 2024-04-11 PROCEDURE — 99396 PREV VISIT EST AGE 40-64: CPT

## 2024-04-11 PROCEDURE — 90471 IMMUNIZATION ADMIN: CPT

## 2024-04-11 PROCEDURE — 3074F SYST BP LT 130 MM HG: CPT

## 2024-04-11 RX ORDER — LISINOPRIL AND HYDROCHLOROTHIAZIDE 25; 20 MG/1; MG/1
1 TABLET ORAL DAILY
Qty: 90 TABLET | Refills: 3 | Status: SHIPPED | OUTPATIENT
Start: 2024-04-11 | End: 2025-04-06

## 2024-04-11 ASSESSMENT — PATIENT HEALTH QUESTIONNAIRE - PHQ9
SUM OF ALL RESPONSES TO PHQ QUESTIONS 1-9: 0
SUM OF ALL RESPONSES TO PHQ QUESTIONS 1-9: 0
2. FEELING DOWN, DEPRESSED OR HOPELESS: NOT AT ALL
SUM OF ALL RESPONSES TO PHQ QUESTIONS 1-9: 0
1. LITTLE INTEREST OR PLEASURE IN DOING THINGS: NOT AT ALL
SUM OF ALL RESPONSES TO PHQ9 QUESTIONS 1 & 2: 0
SUM OF ALL RESPONSES TO PHQ QUESTIONS 1-9: 0

## 2024-04-11 NOTE — PROGRESS NOTES
1. Need for Tdap vaccination  -     Tdap, BOOSTRIX, (age 10 yrs+), IM  2. Essential hypertension  Assessment & Plan:   At goal, continue current medications and continue current treatment plan continue current medication lisinopril-hydrochlorothiazide 20-25 mg tablets 1 tablet mouth daily.  Denies any side effects other than still having some erectile dysfunction.  Orders:  -     lisinopril-hydroCHLOROthiazide (PRINZIDE;ZESTORETIC) 20-25 MG per tablet; Take 1 tablet by mouth daily, Disp-90 tablet, R-3Normal  -     CBC with Auto Differential  -     Comprehensive Metabolic Panel  3. ACP (advance care planning)  4. Encounter for well adult exam without abnormal findings  5. Encounter for lipid screening for cardiovascular disease  -     Lipid Panel  Will order lipid panel, fasting.  Office will call with results.      Personalized Preventive Plan   Current Health Maintenance Status  Immunization History   Administered Date(s) Administered    COVID-19, J&J, (age 18y+), IM, 0.5 mL 03/05/2021, 11/04/2021    COVID-19, MODERNA BLUE border, Primary or Immunocompromised, (age 12y+), IM, 100 mcg/0.5mL 07/18/2022    COVID-19, MODERNA Bivalent, (age 12y+), IM, 50 mcg/0.5 mL 01/23/2023    Influenza Virus Vaccine 12/16/2013, 01/19/2016    Influenza, FLUARIX, FLULAVAL, FLUZONE (age 6 mo+) AND AFLURIA, (age 3 y+), PF, 0.5mL 01/16/2017, 12/19/2017, 02/13/2020, 10/12/2020    Influenza, FLUCELVAX, (age 6 mo+), MDCK, PF, 0.5mL 09/01/2021, 09/22/2022    TDaP, ADACEL (age 10y-64y), BOOSTRIX (age 10y+), IM, 0.5mL 06/19/2013, 04/11/2024    Zoster Recombinant (Shingrix) 11/25/2020, 01/28/2021        Health Maintenance   Topic Date Due    COVID-19 Vaccine (5 - 2023-24 season) 09/01/2023    Diabetes screen  03/09/2025    Colorectal Cancer Screen  03/25/2025    Depression Screen  04/11/2025    Lipids  04/12/2029    DTaP/Tdap/Td vaccine (3 - Td or Tdap) 04/11/2034    Flu vaccine  Completed    Shingles vaccine  Completed    Respiratory

## 2024-04-12 LAB
ALBUMIN/GLOBULIN RATIO: 1.5 G/DL
ALBUMIN: 4 G/DL (ref 3.5–5)
ALP BLD-CCNC: 55 UNITS/L (ref 38–126)
ALT SERPL-CCNC: 15 UNITS/L (ref 4–50)
ANION GAP SERPL CALCULATED.3IONS-SCNC: 6.1 MMOL/L (ref 3–11)
AST SERPL-CCNC: 23 UNITS/L (ref 17–59)
BASOPHILS %: 0.93 (ref 0–3)
BASOPHILS ABSOLUTE: 0.06 (ref 0–0.3)
BILIRUB SERPL-MCNC: 0.9 MG/DL (ref 0.2–1.3)
BUN BLDV-MCNC: 21 MG/DL (ref 9–20)
CALCIUM SERPL-MCNC: 9.6 MG/DL (ref 8.4–10.2)
CHLORIDE BLD-SCNC: 103 MMOL/L (ref 98–120)
CHOLESTEROL/HDL RATIO: 2.68 RATIO (ref 0–4.5)
CHOLESTEROL: 196 MG/DL (ref 50–200)
CO2: 29 MMOL/L (ref 22–31)
CREAT SERPL-MCNC: 1 MG/DL (ref 0.7–1.3)
EOSINOPHILS %: 2.33 (ref 0–10)
EOSINOPHILS ABSOLUTE: 0.14 (ref 0–1.1)
GFR CALCULATED: > 60
GLOBULIN: 2.7 G/DL
GLUCOSE: 104 MG/DL (ref 75–110)
HCT VFR BLD CALC: 46.2 % (ref 42–52)
HDLC SERPL-MCNC: 73 MG/DL (ref 36–68)
HEMOGLOBIN: 14.7 (ref 13.8–17.8)
LDL CHOLESTEROL CALCULATED: 103.8 MG/DL (ref 0–160)
LYMPHOCYTE %: 22.56 (ref 20–51.1)
LYMPHOCYTES ABSOLUTE: 1.35 (ref 1–5.5)
MCH RBC QN AUTO: 28.7 PG (ref 28.5–32.5)
MCHC RBC AUTO-ENTMCNC: 31.9 G/DL (ref 32–37)
MCV RBC AUTO: 90.1 FL (ref 80–94)
MONOCYTES %: 11.71 (ref 1.7–9.3)
MONOCYTES ABSOLUTE: 0.7 (ref 0.1–1)
NEUTROPHILS ABSOLUTE: 3.75 (ref 2–8.1)
NEUTROPHILS RELATIVE PERCENT: 62.47 (ref 42.2–75.2)
PDW BLD-RTO: 12.4 % (ref 10–15.5)
PLATELET # BLD: 200.8 THOU/MM3 (ref 130–400)
POTASSIUM SERPL-SCNC: 4.2 MMOL/L (ref 3.6–5)
RBC: 5.13 M/UL (ref 4.7–6.1)
SODIUM BLD-SCNC: 138 MMOL/L (ref 135–145)
TOTAL PROTEIN, SERUM: 6.6 G/DL (ref 6.3–8.2)
TRIGL SERPL-MCNC: 96 MG/DL (ref 10–250)
VLDLC SERPL CALC-MCNC: 19 MG/DL (ref 0–50)
WBC: 6 THOU/ML3 (ref 4.8–10.8)

## 2024-04-17 NOTE — ASSESSMENT & PLAN NOTE
At goal, continue current medications and continue current treatment plan continue current medication lisinopril-hydrochlorothiazide 20-25 mg tablets 1 tablet mouth daily.  Denies any side effects other than still having some erectile dysfunction.

## 2024-07-03 LAB — DIAGNOSTIC PSA: 2.91 NG/ML (ref 0–4)

## 2024-07-24 ENCOUNTER — OFFICE VISIT (OUTPATIENT)
Dept: UROLOGY | Age: 64
End: 2024-07-24
Payer: MEDICARE

## 2024-07-24 VITALS
DIASTOLIC BLOOD PRESSURE: 80 MMHG | HEIGHT: 68 IN | BODY MASS INDEX: 28.79 KG/M2 | SYSTOLIC BLOOD PRESSURE: 144 MMHG | WEIGHT: 190 LBS

## 2024-07-24 DIAGNOSIS — R97.20 ELEVATED PSA: ICD-10-CM

## 2024-07-24 DIAGNOSIS — N52.01 ERECTILE DYSFUNCTION DUE TO ARTERIAL INSUFFICIENCY: ICD-10-CM

## 2024-07-24 DIAGNOSIS — N13.8 HYPERTROPHY OF PROSTATE WITH URINARY OBSTRUCTION: Primary | ICD-10-CM

## 2024-07-24 DIAGNOSIS — N40.1 HYPERTROPHY OF PROSTATE WITH URINARY OBSTRUCTION: Primary | ICD-10-CM

## 2024-07-24 PROCEDURE — G8419 CALC BMI OUT NRM PARAM NOF/U: HCPCS | Performed by: UROLOGY

## 2024-07-24 PROCEDURE — G8427 DOCREV CUR MEDS BY ELIG CLIN: HCPCS | Performed by: UROLOGY

## 2024-07-24 PROCEDURE — 3077F SYST BP >= 140 MM HG: CPT | Performed by: UROLOGY

## 2024-07-24 PROCEDURE — 3017F COLORECTAL CA SCREEN DOC REV: CPT | Performed by: UROLOGY

## 2024-07-24 PROCEDURE — 3079F DIAST BP 80-89 MM HG: CPT | Performed by: UROLOGY

## 2024-07-24 PROCEDURE — 1036F TOBACCO NON-USER: CPT | Performed by: UROLOGY

## 2024-07-24 PROCEDURE — 1123F ACP DISCUSS/DSCN MKR DOCD: CPT | Performed by: UROLOGY

## 2024-07-24 PROCEDURE — 99214 OFFICE O/P EST MOD 30 MIN: CPT | Performed by: UROLOGY

## 2024-07-24 RX ORDER — TADALAFIL 20 MG/1
20 TABLET ORAL PRN
Qty: 30 TABLET | Refills: 1 | Status: SHIPPED | OUTPATIENT
Start: 2024-07-24 | End: 2024-10-22

## 2024-07-24 NOTE — PROGRESS NOTES
Dr. Desean Vo Jr, MD MD  [facility] Consultation / New Patient Visit    Patient:  Salvador Snell  YOB: 1960  Date: 7/24/2024  Consult requested from Melvin Menendez APRN - CNP     HISTORY OF PRESENT ILLNESS:   The patient is a 64 y.o. male who presents today for follow-up for the following problem(s): elevated PSA  Overall the problem(s) : are worsening.  Associated Symptoms: No dysuria, gross hematuria.   Pain Severity:      Today visit:   7/26/23   Salvador presents for history of BPH with LUTs and elevated PSA  - LUTs, s/p Urolift (10/2022) - much improved - AUASS: 2/0, 1/0 (Urolift), 20/3.  PSA reveiwed: stable at 2.95, 2.79, 2.77, (2.69)    ED - success with Cialis    Summary of old records:   (Patient's old records, notes and chart reviewed and summarized above.)    Last several PSA's:  Lab Results   Component Value Date    PSA 2.91 07/03/2024    PSA 2.95 07/19/2023    PSA 2.79 06/27/2022       Last total testosterone:  No results found for: \"TESTOSTERONE\"    Urinalysis today:  No results found for this visit on 07/24/24.      Last BUN and creatinine:  Lab Results   Component Value Date    BUN 21 (H) 04/12/2024     Lab Results   Component Value Date    CREATININE 1.0 04/12/2024       Imaging Reviewed during this Office Visit:   (results were independently reviewed by physician and radiology report verified)    PAST MEDICAL, FAMILY AND SOCIAL HISTORY:  Past Medical History:   Diagnosis Date    Back pain     history of    Basal cell cancer 2008    history of, right scapula, right eyelid, mid back,    Benign prostatic hypertrophy     history of    Cancer of lower lip     pre cancer per dermatologist    Carotid bruit     history of    Chondromalacia     External hemorrhoid     history of    Fibrous histiocytoma     history of, left ankle    Hearing deficit     Hypertension     Left hip pain     with arthritis    Numbness and tingling     of arms    Rectal bleeding     history of

## 2024-07-30 DIAGNOSIS — N52.01 ERECTILE DYSFUNCTION DUE TO ARTERIAL INSUFFICIENCY: Primary | ICD-10-CM

## 2024-07-30 RX ORDER — TADALAFIL 20 MG/1
20 TABLET ORAL PRN
Qty: 30 TABLET | Refills: 1 | Status: CANCELLED | OUTPATIENT
Start: 2024-07-30 | End: 2024-10-28

## 2024-07-31 ENCOUNTER — TELEPHONE (OUTPATIENT)
Dept: UROLOGY | Age: 64
End: 2024-07-31

## 2024-07-31 RX ORDER — TADALAFIL 20 MG/1
20 TABLET ORAL PRN
Qty: 30 TABLET | Refills: 11 | Status: SHIPPED | OUTPATIENT
Start: 2024-07-31

## 2024-07-31 NOTE — TELEPHONE ENCOUNTER
Patient called today stating he called yesterday and spoke with someone to have his     tadalafil (CIALIS) 20 MG tablet  refilled to [facility] in [place] by Dr. Vo.    He states he checked and it has not been called in there yet.    Please call him back and give him the status of this request.    [phone]

## 2024-07-31 NOTE — TELEPHONE ENCOUNTER
Salvador called requesting a refill of the below medication which has been pended for you:     Requested Prescriptions     Pending Prescriptions Disp Refills    tadalafil (CIALIS) 20 MG tablet 30 tablet 11     Sig: Take 1 tablet by mouth as needed for Erectile Dysfunction Take at least 1/2 hour prior to sexual intercourse       Last Appointment Date: 7/24/2024  Next Appointment Date: 7/23/2025    No Known Allergies

## 2025-02-24 ENCOUNTER — TELEPHONE (OUTPATIENT)
Dept: FAMILY MEDICINE CLINIC | Age: 65
End: 2025-02-24

## 2025-02-24 ENCOUNTER — TELEPHONE (OUTPATIENT)
Dept: SURGERY | Age: 65
End: 2025-02-24

## 2025-02-24 ENCOUNTER — NURSE ONLY (OUTPATIENT)
Dept: FAMILY MEDICINE CLINIC | Age: 65
End: 2025-02-24
Payer: MEDICARE

## 2025-02-24 DIAGNOSIS — Z23 NEED FOR PNEUMOCOCCAL VACCINATION: ICD-10-CM

## 2025-02-24 DIAGNOSIS — Z12.11 ENCOUNTER FOR SCREENING COLONOSCOPY: Primary | ICD-10-CM

## 2025-02-24 DIAGNOSIS — Z23 NEED FOR INFLUENZA VACCINATION: Primary | ICD-10-CM

## 2025-02-24 PROCEDURE — PBSHW PNEUMOCOCCAL, PCV20, PREVNAR 20, (AGE 6W+), IM, PF

## 2025-02-24 PROCEDURE — G0008 ADMIN INFLUENZA VIRUS VAC: HCPCS

## 2025-02-24 PROCEDURE — 99211 OFF/OP EST MAY X REQ PHY/QHP: CPT

## 2025-02-24 PROCEDURE — 90677 PCV20 VACCINE IM: CPT

## 2025-02-24 PROCEDURE — PBSHW INFLUENZA, FLUAD TRIVALENT, (AGE 65 Y+), IM, PRESERVATIVE FREE, 0.5ML

## 2025-03-03 ENCOUNTER — TELEPHONE (OUTPATIENT)
Dept: SURGERY | Age: 65
End: 2025-03-03

## 2025-03-03 RX ORDER — ASPIRIN 81 MG/1
81 TABLET ORAL DAILY
COMMUNITY

## 2025-03-03 RX ORDER — ATORVASTATIN CALCIUM 80 MG/1
80 TABLET, FILM COATED ORAL NIGHTLY
COMMUNITY
Start: 2025-02-03 | End: 2028-01-19

## 2025-03-03 NOTE — TELEPHONE ENCOUNTER
[facility]         Patient:Salvador Snell           :1960           Surgical/Procedure Planned: Colonoscopy    Date & Location: [facility]       Outpatient   Planned Length of OR: 30 min    Sedation: intravenous sedation    This is notification of the scheduled procedure only: Medication hold    Estimated Cardiac Risk for Non-Cardiac Surgery/Procedure     Low______ Moderate______ High______    Medication Instructions - Clarification needed by this date:       ASA 81 mg/325 mg Hold ___ Days      Resume medications:     Lovenox indicated: _____Yes _____NO    Provider:Melvin TIWARI      Signature of Provider Giving Orders for Medication holds:    _____________________________________________

## 2025-03-10 PROBLEM — C44.91 BASAL CELL CARCINOMA (BCC): Status: ACTIVE | Noted: 2025-03-10

## 2025-03-10 PROBLEM — I65.21 STENOSIS OF RIGHT CAROTID ARTERY: Status: ACTIVE | Noted: 2024-12-05

## 2025-03-10 PROBLEM — Z98.890 STATUS POST CAROTID ENDARTERECTOMY: Status: ACTIVE | Noted: 2024-12-10

## 2025-03-10 NOTE — TELEPHONE ENCOUNTER
Please schedule wellness appointment for patient to address ASA 81 mg hold prior to colonoscopy. Thank you in advance!

## 2025-04-08 ASSESSMENT — PATIENT HEALTH QUESTIONNAIRE - PHQ9
SUM OF ALL RESPONSES TO PHQ QUESTIONS 1-9: 0
SUM OF ALL RESPONSES TO PHQ QUESTIONS 1-9: 0
1. LITTLE INTEREST OR PLEASURE IN DOING THINGS: NOT AT ALL
SUM OF ALL RESPONSES TO PHQ9 QUESTIONS 1 & 2: 0
2. FEELING DOWN, DEPRESSED OR HOPELESS: NOT AT ALL
SUM OF ALL RESPONSES TO PHQ QUESTIONS 1-9: 0
SUM OF ALL RESPONSES TO PHQ QUESTIONS 1-9: 0
1. LITTLE INTEREST OR PLEASURE IN DOING THINGS: NOT AT ALL
2. FEELING DOWN, DEPRESSED OR HOPELESS: NOT AT ALL

## 2025-04-14 ENCOUNTER — OFFICE VISIT (OUTPATIENT)
Dept: FAMILY MEDICINE CLINIC | Age: 65
End: 2025-04-14
Payer: MEDICARE

## 2025-04-14 ENCOUNTER — TELEPHONE (OUTPATIENT)
Dept: FAMILY MEDICINE CLINIC | Age: 65
End: 2025-04-14

## 2025-04-14 ENCOUNTER — TELEPHONE (OUTPATIENT)
Dept: SURGERY | Age: 65
End: 2025-04-14

## 2025-04-14 VITALS
HEIGHT: 68 IN | BODY MASS INDEX: 27.58 KG/M2 | OXYGEN SATURATION: 98 % | HEART RATE: 55 BPM | DIASTOLIC BLOOD PRESSURE: 78 MMHG | SYSTOLIC BLOOD PRESSURE: 134 MMHG | WEIGHT: 182 LBS

## 2025-04-14 DIAGNOSIS — Z00.00 WELCOME TO MEDICARE PREVENTIVE VISIT: Primary | ICD-10-CM

## 2025-04-14 DIAGNOSIS — Z13.220 ENCOUNTER FOR LIPID SCREENING FOR CARDIOVASCULAR DISEASE: ICD-10-CM

## 2025-04-14 DIAGNOSIS — Z13.6 ENCOUNTER FOR LIPID SCREENING FOR CARDIOVASCULAR DISEASE: ICD-10-CM

## 2025-04-14 DIAGNOSIS — I10 ESSENTIAL HYPERTENSION: ICD-10-CM

## 2025-04-14 DIAGNOSIS — Z13.6 SCREENING FOR AAA (ABDOMINAL AORTIC ANEURYSM): ICD-10-CM

## 2025-04-14 PROCEDURE — 1123F ACP DISCUSS/DSCN MKR DOCD: CPT

## 2025-04-14 PROCEDURE — 3017F COLORECTAL CA SCREEN DOC REV: CPT

## 2025-04-14 PROCEDURE — G0402 INITIAL PREVENTIVE EXAM: HCPCS

## 2025-04-14 PROCEDURE — 99212 OFFICE O/P EST SF 10 MIN: CPT

## 2025-04-14 PROCEDURE — 3075F SYST BP GE 130 - 139MM HG: CPT

## 2025-04-14 PROCEDURE — 3078F DIAST BP <80 MM HG: CPT

## 2025-04-14 SDOH — ECONOMIC STABILITY: FOOD INSECURITY: WITHIN THE PAST 12 MONTHS, THE FOOD YOU BOUGHT JUST DIDN'T LAST AND YOU DIDN'T HAVE MONEY TO GET MORE.: NEVER TRUE

## 2025-04-14 SDOH — ECONOMIC STABILITY: FOOD INSECURITY: WITHIN THE PAST 12 MONTHS, YOU WORRIED THAT YOUR FOOD WOULD RUN OUT BEFORE YOU GOT MONEY TO BUY MORE.: NEVER TRUE

## 2025-04-14 ASSESSMENT — PATIENT HEALTH QUESTIONNAIRE - PHQ9
SUM OF ALL RESPONSES TO PHQ QUESTIONS 1-9: 0
1. LITTLE INTEREST OR PLEASURE IN DOING THINGS: NOT AT ALL
SUM OF ALL RESPONSES TO PHQ QUESTIONS 1-9: 0
2. FEELING DOWN, DEPRESSED OR HOPELESS: NOT AT ALL
SUM OF ALL RESPONSES TO PHQ QUESTIONS 1-9: 0
SUM OF ALL RESPONSES TO PHQ QUESTIONS 1-9: 0

## 2025-04-14 ASSESSMENT — LIFESTYLE VARIABLES
HOW OFTEN DO YOU HAVE A DRINK CONTAINING ALCOHOL: NEVER
HOW MANY STANDARD DRINKS CONTAINING ALCOHOL DO YOU HAVE ON A TYPICAL DAY: PATIENT DOES NOT DRINK

## 2025-04-14 NOTE — PROGRESS NOTES
Medicare Annual Wellness Visit    Salvador Snell is here for Medicare AWV (Patient is here today for annual wellness. )    Assessment & Plan   Welcome to Medicare preventive visit  Screening for AAA (abdominal aortic aneurysm)  Essential hypertension  -     Basic Metabolic Panel; Future  -     CBC with Auto Differential; Future  Encounter for lipid screening for cardiovascular disease  -     Lipid Panel; Future  Screening blood work is discussed and agreed upon.     Return if symptoms worsen or fail to improve.     Subjective       Patient's complete Health Risk Assessment and screening values have been reviewed and are found in Flowsheets. The following problems were reviewed today and where indicated follow up appointments were made and/or referrals ordered.    No Positive Risk Factors identified today.                                    Objective   Vitals:    04/14/25 1110   BP: 134/78   BP Site: Right Upper Arm   Patient Position: Sitting   Pulse: 55   SpO2: 98%   Weight: 82.6 kg (182 lb)   Height: 1.727 m (5' 8\")      Body mass index is 27.67 kg/m².        Physical Exam  Vitals and nursing note reviewed.   HENT:      Head: Normocephalic.      Nose: Nose normal. No congestion.   Cardiovascular:      Rate and Rhythm: Normal rate and regular rhythm.      Pulses: Normal pulses.      Heart sounds: Normal heart sounds. No murmur heard.  Pulmonary:      Effort: Pulmonary effort is normal.      Breath sounds: Normal breath sounds. No wheezing or rales.   Abdominal:      General: Bowel sounds are normal.      Tenderness: There is no abdominal tenderness.   Skin:     General: Skin is warm and dry.      Capillary Refill: Capillary refill takes less than 2 seconds.   Neurological:      Mental Status: He is alert.               No Known Allergies  Prior to Visit Medications    Medication Sig Taking? Authorizing Provider   atorvastatin (LIPITOR) 80 MG tablet Take 1 tablet by mouth nightly Yes Provider, MD Josiah   aspirin

## 2025-04-16 ENCOUNTER — PREP FOR PROCEDURE (OUTPATIENT)
Dept: SURGERY | Age: 65
End: 2025-04-16

## 2025-04-16 DIAGNOSIS — Z12.11 SCREENING FOR COLON CANCER: ICD-10-CM

## 2025-04-16 PROBLEM — Z96.659 HISTORY OF TOTAL KNEE REPLACEMENT: Status: ACTIVE | Noted: 2025-04-16

## 2025-04-17 ENCOUNTER — RESULTS FOLLOW-UP (OUTPATIENT)
Dept: FAMILY MEDICINE CLINIC | Age: 65
End: 2025-04-17

## 2025-04-17 ENCOUNTER — TELEPHONE (OUTPATIENT)
Dept: FAMILY MEDICINE CLINIC | Age: 65
End: 2025-04-17

## 2025-04-17 LAB
ANION GAP SERPL CALCULATED.3IONS-SCNC: 3.2 MMOL/L (ref 3–11)
BASOPHILS ABSOLUTE: 0.07 X10E3/?L (ref 0–0.3)
BASOPHILS RELATIVE PERCENT: 1.54 % (ref 0–3)
BUN BLDV-MCNC: 15 MG/DL (ref 9–20)
CALCIUM SERPL-MCNC: 9.1 MG/DL (ref 8.4–10.2)
CHLORIDE BLD-SCNC: 104 MMOL/L (ref 98–120)
CHOLESTEROL, TOTAL: 130 MG/DL (ref 50–200)
CHOLESTEROL/HDL RATIO: 1.73 RATIO (ref 0–4.5)
CO2: 30 MMOL/L (ref 22–31)
CREAT SERPL-MCNC: 0.8 MG/DL (ref 0.7–1.3)
EOSINOPHILS ABSOLUTE: 0.11 X10E3/?L (ref 0–1.1)
EOSINOPHILS RELATIVE PERCENT: 2.39 % (ref 0–10)
GFR, ESTIMATED: > 60
GLUCOSE: 97 MG/DL (ref 75–110)
HCT VFR BLD CALC: 44.4 % (ref 42–52)
HDLC SERPL-MCNC: 75 MG/DL (ref 36–68)
HEMOGLOBIN: 13.8 G/DL (ref 13.8–17.8)
LDL CHOLESTEROL: 36.6 MG/DL (ref 0–160)
LYMPHOCYTES ABSOLUTE: 1.41 X10E3/?L (ref 1–5.5)
LYMPHOCYTES RELATIVE PERCENT: 29.57 % (ref 20–51.1)
MCH RBC QN AUTO: 26.7 PG (ref 28.5–32.5)
MCHC RBC AUTO-ENTMCNC: 31.1 G/DL (ref 32–37)
MCV RBC AUTO: 85.6 FL (ref 80–94)
MONOCYTES ABSOLUTE: 0.47 X10E3/?L (ref 0.1–1)
MONOCYTES RELATIVE PERCENT: 9.78 % (ref 1.7–9.3)
NEUTROPHILS ABSOLUTE: 2.7 X10E3/?L (ref 2–8.1)
NEUTROPHILS RELATIVE PERCENT: 56.71 % (ref 42.2–75.2)
PDW BLD-RTO: 12.5 % (ref 10–15.5)
PLATELET # BLD: 202.5 THOU/MM3 (ref 130–400)
POTASSIUM SERPL-SCNC: 4.3 MMOL/L (ref 3.6–5)
RBC # BLD: 5.19 M/UL (ref 4.7–6.1)
SODIUM BLD-SCNC: 137 MMOL/L (ref 135–145)
TRIGL SERPL-MCNC: 92 MG/DL (ref 10–250)
VLDLC SERPL CALC-MCNC: 18 MG/DL (ref 0–50)
WBC # BLD: 4.8 THOU/ML3 (ref 4.8–10.8)

## 2025-05-06 ENCOUNTER — ANESTHESIA EVENT (OUTPATIENT)
Dept: OPERATING ROOM | Age: 65
End: 2025-05-06
Payer: MEDICARE

## 2025-05-06 NOTE — ANESTHESIA PRE PROCEDURE
Department of Anesthesiology  Preprocedure Note       Name:  Salvador Snell   Age:  64 y.o.  :  1960                                          MRN:  [id number]         Date:  2025      Surgeon: Surgeon(s):  Ousmane Castillo DO    Procedure: Procedure(s):  Colonoscopy    Medications prior to admission:   Prior to Admission medications    Medication Sig Start Date End Date Taking? Authorizing Provider   polyethylene glycol-electrolytes (NULYTELY LEMON-LIME) 420 g solution Mix and take as directed. Beginning at 4:00 pm the day before your procedure, drink an 8 ounce glass every 10-15 minutes until gone. 25   Ousmane Castillo DO   bisacodyl 5 MG EC tablet Take two 5 mg tablets by mouth at 12:00 pm (noon) the day before your procedure. 25   Ousmane Castillo DO   atorvastatin (LIPITOR) 80 MG tablet Take 1 tablet by mouth nightly 2/3/25 1/19/28  ProviderJosiah MD   aspirin 81 MG EC tablet Take 1 tablet by mouth daily    ProviderJosiah MD   tadalafil (CIALIS) 20 MG tablet Take 1 tablet by mouth as needed for Erectile Dysfunction Take at least 1/2 hour prior to sexual intercourse 24  Desean Vo Jr., MD   lisinopril-hydroCHLOROthiazide (PRINZIDE;ZESTORETIC) 20-25 MG per tablet Take 1 tablet by mouth daily 24  Melvin Menendez APRN - CNP   ibuprofen (ADVIL;MOTRIN) 200 MG tablet Take 2 tablets by mouth every 6 hours as needed for Pain prn    Provider, MD Josiah       Current medications:    No current facility-administered medications for this encounter.     Current Outpatient Medications   Medication Sig Dispense Refill    polyethylene glycol-electrolytes (NULYTELY LEMON-LIME) 420 g solution Mix and take as directed. Beginning at 4:00 pm the day before your procedure, drink an 8 ounce glass every 10-15 minutes until gone. 4000 mL 0    bisacodyl 5 MG EC tablet Take two 5 mg tablets by mouth at 12:00 pm (noon) the day before your procedure. 2 tablet 0

## 2025-05-06 NOTE — H&P
Subjective   Salvador Snell is a 64 y.o. male who presents today for repeat screening colonoscopy.  Last was in 2015.  Denies any recent changes in bowel movements, blood per rectum, melena or unintended weight loss.  No reported family hx of colon cancer.    Past Medical History:   Diagnosis Date    Back pain     history of    Basal cell cancer 01/01/2008    history of, right scapula, right eyelid, mid back,    Benign prostatic hypertrophy     history of    Cancer of lower lip     pre cancer per dermatologist    Carotid bruit     history of    Chondromalacia     External hemorrhoid     history of    Fibrous histiocytoma     history of, left ankle    Hearing deficit     Hypertension     Left hip pain     with arthritis    Numbness and tingling     of arms    Rectal bleeding     history of    Right knee pain     history of    Right shoulder pain     history of    Ulnar neuropathy     history of       Past Surgical History:   Procedure Laterality Date    CAROTID ENDARTERECTOMY Right 12/10/2024    ENDARTERECTOMY, RIGHT INTERNAL CAROTID; Geri Strange MD @ [facility]    COLONOSCOPY N/A 03/22/2005    normal repeat in 10 years (2025) - performed by Dr. Escobedo @ [facility]    COLONOSCOPY N/A 03/25/2015    hemorrhoids - repeat 10 years (2025); performd by Dr. aBrrera @ [facility]    CYSTOSCOPY N/A 10/19/2022    CYSTOSCOPY UROLIFT performed by Desean Vo Jr., MD at [facility] OR    KNEE SURGERY Right 1981    MOHS SURGERY Right 09/18/2008    wide exc ba cell ca with z plasty right scapula    MOHS SURGERY Right 12/13/2011    right eyelid basal cell cancer    MOHS SURGERY Left 01/28/2015    left upper nose basal cell carcinoman Dr [place]    ROTATOR CUFF REPAIR Bilateral 1997    SHOULDER ARTHROPLASTY Left 01/30/2019 1/30/2019 [facility] Dr [facility]    TOTAL KNEE ARTHROPLASTY Right 2009    VASECTOMY N/A        No current facility-administered medications for this encounter.     Current Outpatient Medications   Medication

## 2025-05-07 ENCOUNTER — HOSPITAL ENCOUNTER (OUTPATIENT)
Age: 65
Setting detail: OUTPATIENT SURGERY
Discharge: HOME | End: 2025-05-07
Attending: SURGERY | Admitting: SURGERY
Payer: MEDICARE

## 2025-05-07 ENCOUNTER — ANESTHESIA (OUTPATIENT)
Dept: OPERATING ROOM | Age: 65
End: 2025-05-07
Payer: MEDICARE

## 2025-05-07 VITALS
WEIGHT: 181 LBS | OXYGEN SATURATION: 99 % | DIASTOLIC BLOOD PRESSURE: 72 MMHG | RESPIRATION RATE: 16 BRPM | TEMPERATURE: 98.4 F | SYSTOLIC BLOOD PRESSURE: 143 MMHG | HEART RATE: 53 BPM | HEIGHT: 69 IN | BODY MASS INDEX: 26.81 KG/M2

## 2025-05-07 PROCEDURE — 3700000000 HC ANESTHESIA ATTENDED CARE: Performed by: SURGERY

## 2025-05-07 PROCEDURE — 2580000003 HC RX 258: Performed by: SURGERY

## 2025-05-07 PROCEDURE — 2580000003 HC RX 258: Performed by: NURSE ANESTHETIST, CERTIFIED REGISTERED

## 2025-05-07 PROCEDURE — 3609027000 HC COLONOSCOPY: Performed by: SURGERY

## 2025-05-07 PROCEDURE — G0121 COLON CA SCRN NOT HI RSK IND: HCPCS | Performed by: SURGERY

## 2025-05-07 PROCEDURE — 7100000010 HC PHASE II RECOVERY - FIRST 15 MIN: Performed by: SURGERY

## 2025-05-07 PROCEDURE — 7100000011 HC PHASE II RECOVERY - ADDTL 15 MIN: Performed by: SURGERY

## 2025-05-07 PROCEDURE — 3700000001 HC ADD 15 MINUTES (ANESTHESIA): Performed by: SURGERY

## 2025-05-07 PROCEDURE — 6360000002 HC RX W HCPCS: Performed by: NURSE ANESTHETIST, CERTIFIED REGISTERED

## 2025-05-07 PROCEDURE — 2709999900 HC NON-CHARGEABLE SUPPLY: Performed by: SURGERY

## 2025-05-07 RX ORDER — SODIUM CHLORIDE 0.9 % (FLUSH) 0.9 %
5-40 SYRINGE (ML) INJECTION EVERY 12 HOURS SCHEDULED
Status: DISCONTINUED | OUTPATIENT
Start: 2025-05-07 | End: 2025-05-07 | Stop reason: HOSPADM

## 2025-05-07 RX ORDER — SODIUM CHLORIDE, SODIUM LACTATE, POTASSIUM CHLORIDE, CALCIUM CHLORIDE 600; 310; 30; 20 MG/100ML; MG/100ML; MG/100ML; MG/100ML
INJECTION, SOLUTION INTRAVENOUS
Status: DISCONTINUED | OUTPATIENT
Start: 2025-05-07 | End: 2025-05-07 | Stop reason: SDUPTHER

## 2025-05-07 RX ORDER — SODIUM CHLORIDE, SODIUM LACTATE, POTASSIUM CHLORIDE, CALCIUM CHLORIDE 600; 310; 30; 20 MG/100ML; MG/100ML; MG/100ML; MG/100ML
INJECTION, SOLUTION INTRAVENOUS CONTINUOUS
Status: DISCONTINUED | OUTPATIENT
Start: 2025-05-07 | End: 2025-05-07 | Stop reason: HOSPADM

## 2025-05-07 RX ORDER — SODIUM CHLORIDE 9 MG/ML
INJECTION, SOLUTION INTRAVENOUS PRN
Status: DISCONTINUED | OUTPATIENT
Start: 2025-05-07 | End: 2025-05-07 | Stop reason: HOSPADM

## 2025-05-07 RX ORDER — PROPOFOL 10 MG/ML
INJECTION, EMULSION INTRAVENOUS
Status: DISCONTINUED | OUTPATIENT
Start: 2025-05-07 | End: 2025-05-07 | Stop reason: SDUPTHER

## 2025-05-07 RX ORDER — SODIUM CHLORIDE 0.9 % (FLUSH) 0.9 %
5-40 SYRINGE (ML) INJECTION PRN
Status: DISCONTINUED | OUTPATIENT
Start: 2025-05-07 | End: 2025-05-07 | Stop reason: HOSPADM

## 2025-05-07 RX ADMIN — SODIUM CHLORIDE, POTASSIUM CHLORIDE, SODIUM LACTATE AND CALCIUM CHLORIDE: 600; 310; 30; 20 INJECTION, SOLUTION INTRAVENOUS at 07:45

## 2025-05-07 RX ADMIN — PROPOFOL 180 MCG/KG/MIN: 10 INJECTION, EMULSION INTRAVENOUS at 08:11

## 2025-05-07 RX ADMIN — PROPOFOL 200 MG: 10 INJECTION, EMULSION INTRAVENOUS at 08:10

## 2025-05-07 RX ADMIN — SODIUM CHLORIDE, POTASSIUM CHLORIDE, SODIUM LACTATE AND CALCIUM CHLORIDE: 600; 310; 30; 20 INJECTION, SOLUTION INTRAVENOUS at 08:10

## 2025-05-07 ASSESSMENT — PAIN - FUNCTIONAL ASSESSMENT: PAIN_FUNCTIONAL_ASSESSMENT: 0-10

## 2025-05-07 ASSESSMENT — PAIN SCALES - GENERAL
PAINLEVEL_OUTOF10: 0
PAINLEVEL_OUTOF10: 0

## 2025-05-07 NOTE — BRIEF OP NOTE
Brief Postoperative Note      Patient: Salvador Snell  YOB: 1960  MRN: [id number]    Date of Procedure: 5/7/2025    Pre-Op Diagnosis Codes:      * Screening for colon cancer [Z12.11]    Post-Op Diagnosis: Same       Procedure(s):  Colonoscopy    Surgeon(s):  Ousmane Castillo DO    Assistant:  * No surgical staff found *    Anesthesia: General    Estimated Blood Loss (mL): Minimal    Complications: None    Specimens:   * No specimens in log *    Implants:  * No implants in log *      Drains: * No LDAs found *    Findings:  Infection Present At Time Of Surgery (PATOS) (choose all levels that have infection present):  No infection present  Other Findings: See dictation      Electronically signed by Ousmane Castillo DO on 5/7/2025 at 8:32 AM

## 2025-05-07 NOTE — PROCEDURES
[facility]                [address] [place] [address]                               COLONOSCOPY      PATIENT NAME: GARCÍA JONES                 : 1960  MED REC NO: [id number]                         ROOM: [facility]  ACCOUNT NO: [id number]                       ADMIT DATE: 2025  PROVIDER: Ousmane Castillo DO      DATE OF PROCEDURE: 2025    SURGEON:  Ousmane Castillo DO    ASSISTANT:  None.    PREOPERATIVE DIAGNOSIS:  Screening.    POSTOPERATIVE DIAGNOSIS:  Screening.    PROCEDURE:  Colonoscopy.    ANESTHESIA:  MAC.    ESTIMATED BLOOD LOSS:  Minimal.    FLUIDS:  Per Anesthesia record.    COMPLICATIONS:  None.    SPECIMENS:  None.    INDICATIONS FOR PROCEDURE:  The patient is a 64-year-old gentleman referred to my office for repeat screening colonoscopy.  After evaluation, decision was made to proceed.  Prior to the time of the procedure, risks, benefits, and alternatives were explained to the patient and consent was obtained.    DESCRIPTION OF PROCEDURE:  The patient was brought to the endoscopy suite, kept on preop gurney, placed in left lateral decubitus position.  Monitoring devices were placed.  MAC anesthesia was induced.  After induction of anesthesia, a time-out was performed, and correct patient and procedure were verified.  Digital rectal exam was performed, which showed no abnormalities.  The Olympus video endoscope was lubricated, inserted in the patient's rectum, which was gently insufflated with air.  The scope was then slowly advanced through colon under visualization to the level of the cecum, which was identified by appendiceal orifice and ileocecal valve.  During advancement of the scope bowel prep was adequate.  Scope was withdrawn through colon with withdrawal time being greater than 7 minutes.  During this time, colonic mucosa was carefully inspected.  The mucosa appeared healthy, and there were no polyps, masses, or other

## 2025-05-08 DIAGNOSIS — I10 ESSENTIAL HYPERTENSION: ICD-10-CM

## 2025-05-08 RX ORDER — LISINOPRIL AND HYDROCHLOROTHIAZIDE 20; 25 MG/1; MG/1
1 TABLET ORAL DAILY
Qty: 90 TABLET | Refills: 3 | Status: SHIPPED | OUTPATIENT
Start: 2025-05-08 | End: 2026-05-03

## 2025-05-08 NOTE — TELEPHONE ENCOUNTER
Salvador Snell is requesting a refill on the following medication(s):  Requested Prescriptions     Pending Prescriptions Disp Refills    lisinopril-hydroCHLOROthiazide (PRINZIDE;ZESTORETIC) 20-25 MG per tablet 90 tablet 3     Sig: Take 1 tablet by mouth daily       Last Visit Date (If Applicable):  4/14/2025    Next Visit Date:    10/14/2025

## 2025-05-16 PROBLEM — Z12.11 SCREENING FOR COLON CANCER: Status: RESOLVED | Noted: 2025-04-16 | Resolved: 2025-05-16

## 2025-07-21 LAB — DIAGNOSTIC PSA: 3.01 NG/ML (ref 0–4)

## (undated) DEVICE — SOLUTION SCRB 4OZ 2% CHG FOR SURG SCRBBING HND WSH

## (undated) DEVICE — CO2 CANNULA,SSOFT,ADLT,7O2,4CO2,FEMALE: Brand: MEDLINE

## (undated) DEVICE — SOLUTION IV IRRIG POUR BRL 0.9% SODIUM CHL 2F7124

## (undated) DEVICE — CATHETER URETH 16FR BLLN 5CC SIL ALLY W/ SIL HYDRGEL 2 W F

## (undated) DEVICE — DRAINBAG,ANTI-REFLUX TOWER,L/F,2000ML,LL: Brand: MEDLINE

## (undated) DEVICE — GLOVE ORANGE PI 7 1/2   MSG9075

## (undated) DEVICE — 4-PORT MANIFOLD: Brand: NEPTUNE 2

## (undated) DEVICE — MERCY DEFIANCE ENDO KIT: Brand: MEDLINE INDUSTRIES, INC.

## (undated) DEVICE — SOLUTION IRRIG 3000ML STRL H2O USP UROMATIC PLAS CONT

## (undated) DEVICE — PACK PROCEDURE SURG CYSTO SVMMC LF